# Patient Record
Sex: FEMALE | Race: WHITE | NOT HISPANIC OR LATINO | Employment: PART TIME | URBAN - METROPOLITAN AREA
[De-identification: names, ages, dates, MRNs, and addresses within clinical notes are randomized per-mention and may not be internally consistent; named-entity substitution may affect disease eponyms.]

---

## 2017-01-10 ENCOUNTER — GENERIC CONVERSION - ENCOUNTER (OUTPATIENT)
Dept: OTHER | Facility: OTHER | Age: 32
End: 2017-01-10

## 2017-01-21 ENCOUNTER — GENERIC CONVERSION - ENCOUNTER (OUTPATIENT)
Dept: OTHER | Facility: OTHER | Age: 32
End: 2017-01-21

## 2017-02-23 ENCOUNTER — GENERIC CONVERSION - ENCOUNTER (OUTPATIENT)
Dept: OTHER | Facility: OTHER | Age: 32
End: 2017-02-23

## 2017-03-15 ENCOUNTER — GENERIC CONVERSION - ENCOUNTER (OUTPATIENT)
Dept: OTHER | Facility: OTHER | Age: 32
End: 2017-03-15

## 2017-06-19 ENCOUNTER — GENERIC CONVERSION - ENCOUNTER (OUTPATIENT)
Dept: OTHER | Facility: OTHER | Age: 32
End: 2017-06-19

## 2017-07-19 ENCOUNTER — ALLSCRIPTS OFFICE VISIT (OUTPATIENT)
Dept: OTHER | Facility: OTHER | Age: 32
End: 2017-07-19

## 2017-07-26 ENCOUNTER — GENERIC CONVERSION - ENCOUNTER (OUTPATIENT)
Dept: OTHER | Facility: OTHER | Age: 32
End: 2017-07-26

## 2017-08-17 RX ORDER — MULTIVITAMIN
1 TABLET ORAL DAILY
COMMUNITY

## 2017-08-21 ENCOUNTER — HOSPITAL ENCOUNTER (EMERGENCY)
Facility: HOSPITAL | Age: 32
Discharge: HOME | End: 2017-08-22
Attending: EMERGENCY MEDICINE | Admitting: EMERGENCY MEDICINE
Payer: COMMERCIAL

## 2017-08-21 ENCOUNTER — APPOINTMENT (EMERGENCY)
Dept: RADIOLOGY | Facility: HOSPITAL | Age: 32
End: 2017-08-21
Payer: COMMERCIAL

## 2017-08-21 ENCOUNTER — ANESTHESIA (OUTPATIENT)
Dept: PERIOP | Facility: HOSPITAL | Age: 32
End: 2017-08-21
Payer: COMMERCIAL

## 2017-08-21 ENCOUNTER — ANESTHESIA EVENT (OUTPATIENT)
Dept: PERIOP | Facility: HOSPITAL | Age: 32
End: 2017-08-21
Payer: COMMERCIAL

## 2017-08-21 ENCOUNTER — HOSPITAL ENCOUNTER (OUTPATIENT)
Facility: HOSPITAL | Age: 32
Setting detail: OUTPATIENT SURGERY
Discharge: HOME | End: 2017-08-21
Attending: OBSTETRICS & GYNECOLOGY | Admitting: OBSTETRICS & GYNECOLOGY
Payer: COMMERCIAL

## 2017-08-21 VITALS
HEART RATE: 74 BPM | WEIGHT: 130 LBS | HEIGHT: 64 IN | BODY MASS INDEX: 22.2 KG/M2 | DIASTOLIC BLOOD PRESSURE: 56 MMHG | TEMPERATURE: 97.9 F | SYSTOLIC BLOOD PRESSURE: 95 MMHG | OXYGEN SATURATION: 99 % | RESPIRATION RATE: 16 BRPM

## 2017-08-21 DIAGNOSIS — R10.9 ABDOMINAL PAIN: Primary | ICD-10-CM

## 2017-08-21 DIAGNOSIS — Z30.2 ENCOUNTER FOR STERILIZATION: ICD-10-CM

## 2017-08-21 PROBLEM — Z90.79 STATUS POST BILATERAL SALPINGECTOMY: Status: ACTIVE | Noted: 2017-08-21

## 2017-08-21 LAB
ALBUMIN SERPL BCP-MCNC: 3.8 G/DL (ref 3.5–5)
ALP SERPL-CCNC: 62 U/L (ref 46–116)
ALT SERPL W P-5'-P-CCNC: 15 U/L (ref 12–78)
ANION GAP SERPL CALCULATED.3IONS-SCNC: 9 MMOL/L (ref 4–13)
ANISOCYTOSIS BLD QL SMEAR: PRESENT
AST SERPL W P-5'-P-CCNC: 7 U/L (ref 5–45)
BASOPHILS # BLD MANUAL: 0 THOUSAND/UL (ref 0–0.1)
BASOPHILS NFR MAR MANUAL: 0 % (ref 0–1)
BILIRUB SERPL-MCNC: 0.29 MG/DL (ref 0.2–1)
BUN SERPL-MCNC: 11 MG/DL (ref 5–25)
CALCIUM SERPL-MCNC: 8.3 MG/DL (ref 8.3–10.1)
CHLORIDE SERPL-SCNC: 107 MMOL/L (ref 100–108)
CO2 SERPL-SCNC: 25 MMOL/L (ref 21–32)
CREAT SERPL-MCNC: 0.66 MG/DL (ref 0.6–1.3)
EOSINOPHIL # BLD MANUAL: 0 THOUSAND/UL (ref 0–0.4)
EOSINOPHIL NFR BLD MANUAL: 0 % (ref 0–6)
ERYTHROCYTE [DISTWIDTH] IN BLOOD BY AUTOMATED COUNT: 12.9 % (ref 11.6–15.1)
ERYTHROCYTE [DISTWIDTH] IN BLOOD BY AUTOMATED COUNT: 12.9 % (ref 11.6–15.1)
EXT PREGNANCY TEST URINE: NEGATIVE
GFR SERPL CREATININE-BSD FRML MDRD: 116 ML/MIN/1.73SQ M
GLUCOSE SERPL-MCNC: 116 MG/DL (ref 65–140)
HCT VFR BLD AUTO: 40.8 % (ref 34.8–46.1)
HCT VFR BLD AUTO: 41.7 % (ref 34.8–46.1)
HGB BLD-MCNC: 14 G/DL (ref 11.5–15.4)
HGB BLD-MCNC: 14.3 G/DL (ref 11.5–15.4)
LACTATE SERPL-SCNC: 1.5 MMOL/L (ref 0.5–2)
LIPASE SERPL-CCNC: 90 U/L (ref 73–393)
LYMPHOCYTES # BLD AUTO: 11 % (ref 14–44)
LYMPHOCYTES # BLD AUTO: 2.03 THOUSAND/UL (ref 0.6–4.47)
MCH RBC QN AUTO: 29.9 PG (ref 26.8–34.3)
MCH RBC QN AUTO: 30.6 PG (ref 26.8–34.3)
MCHC RBC AUTO-ENTMCNC: 33.6 G/DL (ref 31.4–37.4)
MCHC RBC AUTO-ENTMCNC: 35 G/DL (ref 31.4–37.4)
MCV RBC AUTO: 87 FL (ref 82–98)
MCV RBC AUTO: 89 FL (ref 82–98)
MONOCYTES # BLD AUTO: 0.55 THOUSAND/UL (ref 0–1.22)
MONOCYTES NFR BLD: 3 % (ref 4–12)
NEUTROPHILS # BLD MANUAL: 15.87 THOUSAND/UL (ref 1.85–7.62)
NEUTS SEG NFR BLD AUTO: 86 % (ref 43–75)
NRBC BLD AUTO-RTO: 0 /100 WBCS
PLATELET # BLD AUTO: 219 THOUSANDS/UL (ref 149–390)
PLATELET # BLD AUTO: 231 THOUSANDS/UL (ref 149–390)
PLATELET BLD QL SMEAR: ADEQUATE
PMV BLD AUTO: 10.7 FL (ref 8.9–12.7)
PMV BLD AUTO: 10.9 FL (ref 8.9–12.7)
POTASSIUM SERPL-SCNC: 3.7 MMOL/L (ref 3.5–5.3)
PROT SERPL-MCNC: 7.3 G/DL (ref 6.4–8.2)
RBC # BLD AUTO: 4.68 MILLION/UL (ref 3.81–5.12)
RBC # BLD AUTO: 4.68 MILLION/UL (ref 3.81–5.12)
RBC MORPH BLD: PRESENT
SODIUM SERPL-SCNC: 141 MMOL/L (ref 136–145)
WBC # BLD AUTO: 18.45 THOUSAND/UL (ref 4.31–10.16)
WBC # BLD AUTO: 6.67 THOUSAND/UL (ref 4.31–10.16)

## 2017-08-21 PROCEDURE — 85007 BL SMEAR W/DIFF WBC COUNT: CPT | Performed by: EMERGENCY MEDICINE

## 2017-08-21 PROCEDURE — 85027 COMPLETE CBC AUTOMATED: CPT | Performed by: EMERGENCY MEDICINE

## 2017-08-21 PROCEDURE — 85027 COMPLETE CBC AUTOMATED: CPT | Performed by: OBSTETRICS & GYNECOLOGY

## 2017-08-21 PROCEDURE — 83605 ASSAY OF LACTIC ACID: CPT | Performed by: EMERGENCY MEDICINE

## 2017-08-21 PROCEDURE — 96375 TX/PRO/DX INJ NEW DRUG ADDON: CPT

## 2017-08-21 PROCEDURE — 83690 ASSAY OF LIPASE: CPT | Performed by: EMERGENCY MEDICINE

## 2017-08-21 PROCEDURE — 74177 CT ABD & PELVIS W/CONTRAST: CPT

## 2017-08-21 PROCEDURE — 88302 TISSUE EXAM BY PATHOLOGIST: CPT | Performed by: OBSTETRICS & GYNECOLOGY

## 2017-08-21 PROCEDURE — 96374 THER/PROPH/DIAG INJ IV PUSH: CPT

## 2017-08-21 PROCEDURE — 96361 HYDRATE IV INFUSION ADD-ON: CPT

## 2017-08-21 PROCEDURE — 36415 COLL VENOUS BLD VENIPUNCTURE: CPT | Performed by: EMERGENCY MEDICINE

## 2017-08-21 PROCEDURE — 81002 URINALYSIS NONAUTO W/O SCOPE: CPT | Performed by: EMERGENCY MEDICINE

## 2017-08-21 PROCEDURE — 81025 URINE PREGNANCY TEST: CPT | Performed by: OBSTETRICS & GYNECOLOGY

## 2017-08-21 PROCEDURE — 80053 COMPREHEN METABOLIC PANEL: CPT | Performed by: EMERGENCY MEDICINE

## 2017-08-21 RX ORDER — ROCURONIUM BROMIDE 10 MG/ML
INJECTION, SOLUTION INTRAVENOUS AS NEEDED
Status: DISCONTINUED | OUTPATIENT
Start: 2017-08-21 | End: 2017-08-21 | Stop reason: SURG

## 2017-08-21 RX ORDER — ONDANSETRON 2 MG/ML
4 INJECTION INTRAMUSCULAR; INTRAVENOUS ONCE AS NEEDED
Status: DISCONTINUED | OUTPATIENT
Start: 2017-08-21 | End: 2017-08-21 | Stop reason: HOSPADM

## 2017-08-21 RX ORDER — OXYCODONE HYDROCHLORIDE AND ACETAMINOPHEN 5; 325 MG/1; MG/1
1 TABLET ORAL EVERY 4 HOURS PRN
Status: DISCONTINUED | OUTPATIENT
Start: 2017-08-21 | End: 2017-08-21 | Stop reason: HOSPADM

## 2017-08-21 RX ORDER — ONDANSETRON 2 MG/ML
INJECTION INTRAMUSCULAR; INTRAVENOUS AS NEEDED
Status: DISCONTINUED | OUTPATIENT
Start: 2017-08-21 | End: 2017-08-21 | Stop reason: SURG

## 2017-08-21 RX ORDER — ONDANSETRON 2 MG/ML
4 INJECTION INTRAMUSCULAR; INTRAVENOUS ONCE
Status: COMPLETED | OUTPATIENT
Start: 2017-08-21 | End: 2017-08-21

## 2017-08-21 RX ORDER — OXYCODONE HYDROCHLORIDE AND ACETAMINOPHEN 5; 325 MG/1; MG/1
1 TABLET ORAL EVERY 4 HOURS PRN
Qty: 10 TABLET | Refills: 0 | Status: SHIPPED | OUTPATIENT
Start: 2017-08-21 | End: 2017-08-31

## 2017-08-21 RX ORDER — FENTANYL CITRATE/PF 50 MCG/ML
25 SYRINGE (ML) INJECTION
Status: DISCONTINUED | OUTPATIENT
Start: 2017-08-21 | End: 2017-08-21 | Stop reason: HOSPADM

## 2017-08-21 RX ORDER — PROPOFOL 10 MG/ML
INJECTION, EMULSION INTRAVENOUS AS NEEDED
Status: DISCONTINUED | OUTPATIENT
Start: 2017-08-21 | End: 2017-08-21 | Stop reason: SURG

## 2017-08-21 RX ORDER — BUPIVACAINE HYDROCHLORIDE 5 MG/ML
INJECTION, SOLUTION PERINEURAL AS NEEDED
Status: DISCONTINUED | OUTPATIENT
Start: 2017-08-21 | End: 2017-08-21 | Stop reason: HOSPADM

## 2017-08-21 RX ORDER — FENTANYL CITRATE 50 UG/ML
INJECTION, SOLUTION INTRAMUSCULAR; INTRAVENOUS AS NEEDED
Status: DISCONTINUED | OUTPATIENT
Start: 2017-08-21 | End: 2017-08-21 | Stop reason: SURG

## 2017-08-21 RX ORDER — METOCLOPRAMIDE HYDROCHLORIDE 5 MG/ML
10 INJECTION INTRAMUSCULAR; INTRAVENOUS ONCE AS NEEDED
Status: DISCONTINUED | OUTPATIENT
Start: 2017-08-21 | End: 2017-08-21 | Stop reason: HOSPADM

## 2017-08-21 RX ORDER — KETOROLAC TROMETHAMINE 30 MG/ML
INJECTION, SOLUTION INTRAMUSCULAR; INTRAVENOUS AS NEEDED
Status: DISCONTINUED | OUTPATIENT
Start: 2017-08-21 | End: 2017-08-21 | Stop reason: SURG

## 2017-08-21 RX ORDER — MAGNESIUM HYDROXIDE 1200 MG/15ML
LIQUID ORAL AS NEEDED
Status: DISCONTINUED | OUTPATIENT
Start: 2017-08-21 | End: 2017-08-21 | Stop reason: HOSPADM

## 2017-08-21 RX ORDER — SODIUM CHLORIDE, SODIUM LACTATE, POTASSIUM CHLORIDE, CALCIUM CHLORIDE 600; 310; 30; 20 MG/100ML; MG/100ML; MG/100ML; MG/100ML
125 INJECTION, SOLUTION INTRAVENOUS CONTINUOUS
Status: DISCONTINUED | OUTPATIENT
Start: 2017-08-21 | End: 2017-08-21 | Stop reason: HOSPADM

## 2017-08-21 RX ORDER — MORPHINE SULFATE 4 MG/ML
4 INJECTION, SOLUTION INTRAMUSCULAR; INTRAVENOUS ONCE
Status: COMPLETED | OUTPATIENT
Start: 2017-08-21 | End: 2017-08-21

## 2017-08-21 RX ORDER — LIDOCAINE HYDROCHLORIDE 10 MG/ML
INJECTION, SOLUTION INFILTRATION; PERINEURAL AS NEEDED
Status: DISCONTINUED | OUTPATIENT
Start: 2017-08-21 | End: 2017-08-21 | Stop reason: SURG

## 2017-08-21 RX ORDER — ONDANSETRON 2 MG/ML
4 INJECTION INTRAMUSCULAR; INTRAVENOUS EVERY 6 HOURS PRN
Status: DISCONTINUED | OUTPATIENT
Start: 2017-08-21 | End: 2017-08-21 | Stop reason: HOSPADM

## 2017-08-21 RX ORDER — OXYCODONE HYDROCHLORIDE AND ACETAMINOPHEN 5; 325 MG/1; MG/1
1 TABLET ORAL EVERY 4 HOURS PRN
Qty: 10 TABLET | Refills: 0 | Status: SHIPPED | OUTPATIENT
Start: 2017-08-21 | End: 2017-08-21 | Stop reason: HOSPADM

## 2017-08-21 RX ORDER — LORAZEPAM 2 MG/ML
1 INJECTION INTRAMUSCULAR ONCE
Status: COMPLETED | OUTPATIENT
Start: 2017-08-21 | End: 2017-08-21

## 2017-08-21 RX ORDER — MIDAZOLAM HYDROCHLORIDE 1 MG/ML
INJECTION INTRAMUSCULAR; INTRAVENOUS AS NEEDED
Status: DISCONTINUED | OUTPATIENT
Start: 2017-08-21 | End: 2017-08-21 | Stop reason: SURG

## 2017-08-21 RX ADMIN — FENTANYL CITRATE 25 MCG: 50 INJECTION INTRAMUSCULAR; INTRAVENOUS at 09:30

## 2017-08-21 RX ADMIN — LORAZEPAM 1 MG: 2 INJECTION INTRAMUSCULAR; INTRAVENOUS at 23:29

## 2017-08-21 RX ADMIN — ONDANSETRON 4 MG: 2 INJECTION INTRAMUSCULAR; INTRAVENOUS at 10:46

## 2017-08-21 RX ADMIN — ONDANSETRON 4 MG: 2 INJECTION INTRAMUSCULAR; INTRAVENOUS at 08:36

## 2017-08-21 RX ADMIN — ONDANSETRON 4 MG: 2 INJECTION INTRAMUSCULAR; INTRAVENOUS at 22:17

## 2017-08-21 RX ADMIN — LIDOCAINE HYDROCHLORIDE 50 MG: 10 INJECTION, SOLUTION INFILTRATION; PERINEURAL at 08:06

## 2017-08-21 RX ADMIN — ROCURONIUM BROMIDE 30 MG: 10 INJECTION, SOLUTION INTRAVENOUS at 08:07

## 2017-08-21 RX ADMIN — HYDROMORPHONE HYDROCHLORIDE 0.4 MG: 1 INJECTION, SOLUTION INTRAMUSCULAR; INTRAVENOUS; SUBCUTANEOUS at 09:51

## 2017-08-21 RX ADMIN — MORPHINE SULFATE 4 MG: 4 INJECTION, SOLUTION INTRAMUSCULAR; INTRAVENOUS at 22:17

## 2017-08-21 RX ADMIN — MIDAZOLAM HYDROCHLORIDE 2 MG: 1 INJECTION, SOLUTION INTRAMUSCULAR; INTRAVENOUS at 07:57

## 2017-08-21 RX ADMIN — DEXAMETHASONE SODIUM PHOSPHATE 5 MG: 10 INJECTION INTRAMUSCULAR; INTRAVENOUS at 08:36

## 2017-08-21 RX ADMIN — SODIUM CHLORIDE, SODIUM LACTATE, POTASSIUM CHLORIDE, AND CALCIUM CHLORIDE: .6; .31; .03; .02 INJECTION, SOLUTION INTRAVENOUS at 07:56

## 2017-08-21 RX ADMIN — FENTANYL CITRATE 150 MCG: 50 INJECTION, SOLUTION INTRAMUSCULAR; INTRAVENOUS at 08:01

## 2017-08-21 RX ADMIN — SODIUM CHLORIDE 1000 ML: 0.9 INJECTION, SOLUTION INTRAVENOUS at 22:19

## 2017-08-21 RX ADMIN — FENTANYL CITRATE 50 MCG: 50 INJECTION, SOLUTION INTRAMUSCULAR; INTRAVENOUS at 08:06

## 2017-08-21 RX ADMIN — FENTANYL CITRATE 25 MCG: 50 INJECTION INTRAMUSCULAR; INTRAVENOUS at 09:36

## 2017-08-21 RX ADMIN — FENTANYL CITRATE 25 MCG: 50 INJECTION INTRAMUSCULAR; INTRAVENOUS at 09:33

## 2017-08-21 RX ADMIN — PROPOFOL 200 MG: 10 INJECTION, EMULSION INTRAVENOUS at 08:06

## 2017-08-21 RX ADMIN — FENTANYL CITRATE 25 MCG: 50 INJECTION INTRAMUSCULAR; INTRAVENOUS at 09:39

## 2017-08-21 RX ADMIN — IOHEXOL 100 ML: 350 INJECTION, SOLUTION INTRAVENOUS at 23:02

## 2017-08-21 RX ADMIN — KETOROLAC TROMETHAMINE 30 MG: 30 INJECTION, SOLUTION INTRAMUSCULAR at 08:55

## 2017-08-22 VITALS
WEIGHT: 130 LBS | BODY MASS INDEX: 22.31 KG/M2 | DIASTOLIC BLOOD PRESSURE: 54 MMHG | SYSTOLIC BLOOD PRESSURE: 102 MMHG | RESPIRATION RATE: 18 BRPM | OXYGEN SATURATION: 97 % | TEMPERATURE: 99.1 F | HEART RATE: 74 BPM

## 2017-08-22 LAB
BACTERIA UR QL AUTO: ABNORMAL /HPF
BILIRUB UR QL STRIP: NEGATIVE
CLARITY UR: CLEAR
CLARITY, POC: CLEAR
COLOR UR: YELLOW
COLOR, POC: YELLOW
GLUCOSE UR STRIP-MCNC: NEGATIVE MG/DL
HGB UR QL STRIP.AUTO: ABNORMAL
HYALINE CASTS #/AREA URNS LPF: ABNORMAL /LPF
KETONES UR STRIP-MCNC: ABNORMAL MG/DL
LEUKOCYTE ESTERASE UR QL STRIP: NEGATIVE
NITRITE UR QL STRIP: NEGATIVE
NON-SQ EPI CELLS URNS QL MICRO: ABNORMAL /HPF
PH UR STRIP.AUTO: 6 [PH] (ref 4.5–8)
PROT UR STRIP-MCNC: NEGATIVE MG/DL
RBC #/AREA URNS AUTO: ABNORMAL /HPF
SP GR UR STRIP.AUTO: 1.01 (ref 1–1.03)
UROBILINOGEN UR QL STRIP.AUTO: 0.2 E.U./DL
WBC #/AREA URNS AUTO: ABNORMAL /HPF

## 2017-08-22 PROCEDURE — 81001 URINALYSIS AUTO W/SCOPE: CPT

## 2017-08-22 PROCEDURE — 99284 EMERGENCY DEPT VISIT MOD MDM: CPT

## 2017-08-22 RX ORDER — ONDANSETRON 4 MG/1
4 TABLET, FILM COATED ORAL EVERY 6 HOURS
Qty: 12 TABLET | Refills: 0 | Status: SHIPPED | OUTPATIENT
Start: 2017-08-22 | End: 2017-11-14

## 2017-08-22 RX ORDER — ALPRAZOLAM 0.5 MG/1
0.5 TABLET ORAL
Qty: 3 TABLET | Refills: 0 | Status: SHIPPED | OUTPATIENT
Start: 2017-08-22 | End: 2017-11-14

## 2017-08-24 ENCOUNTER — TRANSCRIBE ORDERS (OUTPATIENT)
Dept: ADMINISTRATIVE | Facility: HOSPITAL | Age: 32
End: 2017-08-24

## 2017-08-24 ENCOUNTER — TRANSCRIBE ORDERS (OUTPATIENT)
Dept: LAB | Facility: HOSPITAL | Age: 32
End: 2017-08-24

## 2017-08-24 ENCOUNTER — ALLSCRIPTS OFFICE VISIT (OUTPATIENT)
Dept: OTHER | Facility: OTHER | Age: 32
End: 2017-08-24

## 2017-08-24 ENCOUNTER — HOSPITAL ENCOUNTER (OUTPATIENT)
Dept: RADIOLOGY | Facility: HOSPITAL | Age: 32
Discharge: HOME | End: 2017-08-24
Attending: OBSTETRICS & GYNECOLOGY
Payer: COMMERCIAL

## 2017-08-24 ENCOUNTER — APPOINTMENT (OUTPATIENT)
Dept: LAB | Facility: HOSPITAL | Age: 32
End: 2017-08-24
Attending: OBSTETRICS & GYNECOLOGY
Payer: COMMERCIAL

## 2017-08-24 DIAGNOSIS — G89.18 POST-OPERATIVE PAIN: Primary | ICD-10-CM

## 2017-08-24 DIAGNOSIS — G89.18 POST-OPERATIVE PAIN: ICD-10-CM

## 2017-08-24 LAB
ALBUMIN SERPL BCP-MCNC: 3.2 G/DL (ref 3.5–5)
ALP SERPL-CCNC: 52 U/L (ref 46–116)
ALT SERPL W P-5'-P-CCNC: 12 U/L (ref 12–78)
ANION GAP SERPL CALCULATED.3IONS-SCNC: 5 MMOL/L (ref 4–13)
AST SERPL W P-5'-P-CCNC: 8 U/L (ref 5–45)
BASOPHILS # BLD AUTO: 0.02 THOUSANDS/ΜL (ref 0–0.1)
BASOPHILS NFR BLD AUTO: 0 % (ref 0–1)
BILIRUB SERPL-MCNC: 0.29 MG/DL (ref 0.2–1)
BUN SERPL-MCNC: 9 MG/DL (ref 5–25)
CALCIUM SERPL-MCNC: 9 MG/DL (ref 8.3–10.1)
CHLORIDE SERPL-SCNC: 107 MMOL/L (ref 100–108)
CO2 SERPL-SCNC: 28 MMOL/L (ref 21–32)
CREAT SERPL-MCNC: 0.5 MG/DL (ref 0.6–1.3)
EOSINOPHIL # BLD AUTO: 0.14 THOUSAND/ΜL (ref 0–0.61)
EOSINOPHIL NFR BLD AUTO: 2 % (ref 0–6)
ERYTHROCYTE [DISTWIDTH] IN BLOOD BY AUTOMATED COUNT: 12.9 % (ref 11.6–15.1)
GFR SERPL CREATININE-BSD FRML MDRD: 127 ML/MIN/1.73SQ M
GLUCOSE SERPL-MCNC: 101 MG/DL (ref 65–140)
HCT VFR BLD AUTO: 38.4 % (ref 34.8–46.1)
HGB BLD-MCNC: 13 G/DL (ref 11.5–15.4)
LYMPHOCYTES # BLD AUTO: 1.52 THOUSANDS/ΜL (ref 0.6–4.47)
LYMPHOCYTES NFR BLD AUTO: 23 % (ref 14–44)
MCH RBC QN AUTO: 30.1 PG (ref 26.8–34.3)
MCHC RBC AUTO-ENTMCNC: 33.9 G/DL (ref 31.4–37.4)
MCV RBC AUTO: 89 FL (ref 82–98)
MONOCYTES # BLD AUTO: 0.46 THOUSAND/ΜL (ref 0.17–1.22)
MONOCYTES NFR BLD AUTO: 7 % (ref 4–12)
NEUTROPHILS # BLD AUTO: 4.6 THOUSANDS/ΜL (ref 1.85–7.62)
NEUTS SEG NFR BLD AUTO: 68 % (ref 43–75)
NRBC BLD AUTO-RTO: 0 /100 WBCS
PLATELET # BLD AUTO: 221 THOUSANDS/UL (ref 149–390)
PMV BLD AUTO: 10.4 FL (ref 8.9–12.7)
POTASSIUM SERPL-SCNC: 3.2 MMOL/L (ref 3.5–5.3)
PROT SERPL-MCNC: 7.5 G/DL (ref 6.4–8.2)
RBC # BLD AUTO: 4.32 MILLION/UL (ref 3.81–5.12)
SODIUM SERPL-SCNC: 140 MMOL/L (ref 136–145)
WBC # BLD AUTO: 6.76 THOUSAND/UL (ref 4.31–10.16)

## 2017-08-24 PROCEDURE — 74177 CT ABD & PELVIS W/CONTRAST: CPT

## 2017-08-24 PROCEDURE — 85025 COMPLETE CBC W/AUTO DIFF WBC: CPT

## 2017-08-24 PROCEDURE — 80053 COMPREHEN METABOLIC PANEL: CPT

## 2017-08-24 PROCEDURE — 36415 COLL VENOUS BLD VENIPUNCTURE: CPT

## 2017-08-24 RX ADMIN — IOHEXOL 100 ML: 350 INJECTION, SOLUTION INTRAVENOUS at 16:19

## 2017-08-29 ENCOUNTER — GENERIC CONVERSION - ENCOUNTER (OUTPATIENT)
Dept: OTHER | Facility: OTHER | Age: 32
End: 2017-08-29

## 2017-09-07 ENCOUNTER — GENERIC CONVERSION - ENCOUNTER (OUTPATIENT)
Dept: OTHER | Facility: OTHER | Age: 32
End: 2017-09-07

## 2017-11-14 ENCOUNTER — APPOINTMENT (EMERGENCY)
Dept: RADIOLOGY | Facility: HOSPITAL | Age: 32
End: 2017-11-14
Payer: COMMERCIAL

## 2017-11-14 ENCOUNTER — HOSPITAL ENCOUNTER (EMERGENCY)
Facility: HOSPITAL | Age: 32
Discharge: HOME | End: 2017-11-14
Attending: EMERGENCY MEDICINE | Admitting: EMERGENCY MEDICINE
Payer: COMMERCIAL

## 2017-11-14 VITALS
WEIGHT: 130 LBS | BODY MASS INDEX: 22.2 KG/M2 | RESPIRATION RATE: 18 BRPM | TEMPERATURE: 97.3 F | HEIGHT: 64 IN | DIASTOLIC BLOOD PRESSURE: 68 MMHG | OXYGEN SATURATION: 100 % | SYSTOLIC BLOOD PRESSURE: 115 MMHG | HEART RATE: 68 BPM

## 2017-11-14 DIAGNOSIS — N83.201 CYST OF RIGHT OVARY: Primary | ICD-10-CM

## 2017-11-14 DIAGNOSIS — R10.9 RIGHT FLANK PAIN: ICD-10-CM

## 2017-11-14 LAB
ALBUMIN SERPL BCP-MCNC: 3.9 G/DL (ref 3.5–5)
ALP SERPL-CCNC: 70 U/L (ref 46–116)
ALT SERPL W P-5'-P-CCNC: 16 U/L (ref 12–78)
ANION GAP SERPL CALCULATED.3IONS-SCNC: 6 MMOL/L (ref 4–13)
AST SERPL W P-5'-P-CCNC: 27 U/L (ref 5–45)
BASOPHILS # BLD AUTO: 0.03 THOUSANDS/ΜL (ref 0–0.1)
BASOPHILS NFR BLD AUTO: 0 % (ref 0–1)
BILIRUB SERPL-MCNC: 0.33 MG/DL (ref 0.2–1)
BILIRUB UR QL STRIP: NEGATIVE
BUN SERPL-MCNC: 15 MG/DL (ref 5–25)
CALCIUM SERPL-MCNC: 8.8 MG/DL (ref 8.3–10.1)
CHLORIDE SERPL-SCNC: 108 MMOL/L (ref 100–108)
CLARITY UR: CLEAR
CO2 SERPL-SCNC: 25 MMOL/L (ref 21–32)
COLOR UR: YELLOW
CREAT SERPL-MCNC: 0.78 MG/DL (ref 0.6–1.3)
EOSINOPHIL # BLD AUTO: 0.17 THOUSAND/ΜL (ref 0–0.61)
EOSINOPHIL NFR BLD AUTO: 2 % (ref 0–6)
ERYTHROCYTE [DISTWIDTH] IN BLOOD BY AUTOMATED COUNT: 13.3 % (ref 11.6–15.1)
EXT PREG TEST URINE: NEGATIVE
GFR SERPL CREATININE-BSD FRML MDRD: 99 ML/MIN/1.73SQ M
GLUCOSE SERPL-MCNC: 83 MG/DL (ref 65–140)
GLUCOSE UR STRIP-MCNC: NEGATIVE MG/DL
HCT VFR BLD AUTO: 42.4 % (ref 34.8–46.1)
HGB BLD-MCNC: 14.7 G/DL (ref 11.5–15.4)
HGB UR QL STRIP.AUTO: NEGATIVE
KETONES UR STRIP-MCNC: NEGATIVE MG/DL
LEUKOCYTE ESTERASE UR QL STRIP: NEGATIVE
LIPASE SERPL-CCNC: 182 U/L (ref 73–393)
LYMPHOCYTES # BLD AUTO: 2.63 THOUSANDS/ΜL (ref 0.6–4.47)
LYMPHOCYTES NFR BLD AUTO: 25 % (ref 14–44)
MCH RBC QN AUTO: 30.4 PG (ref 26.8–34.3)
MCHC RBC AUTO-ENTMCNC: 34.7 G/DL (ref 31.4–37.4)
MCV RBC AUTO: 88 FL (ref 82–98)
MONOCYTES # BLD AUTO: 0.43 THOUSAND/ΜL (ref 0.17–1.22)
MONOCYTES NFR BLD AUTO: 4 % (ref 4–12)
NEUTROPHILS # BLD AUTO: 7.09 THOUSANDS/ΜL (ref 1.85–7.62)
NEUTS SEG NFR BLD AUTO: 69 % (ref 43–75)
NITRITE UR QL STRIP: NEGATIVE
NRBC BLD AUTO-RTO: 0 /100 WBCS
PH UR STRIP.AUTO: 6 [PH] (ref 4.5–8)
PLATELET # BLD AUTO: 278 THOUSANDS/UL (ref 149–390)
PMV BLD AUTO: 10.7 FL (ref 8.9–12.7)
POTASSIUM SERPL-SCNC: 4.6 MMOL/L (ref 3.5–5.3)
PROT SERPL-MCNC: 7.5 G/DL (ref 6.4–8.2)
PROT UR STRIP-MCNC: NEGATIVE MG/DL
RBC # BLD AUTO: 4.84 MILLION/UL (ref 3.81–5.12)
SODIUM SERPL-SCNC: 139 MMOL/L (ref 136–145)
SP GR UR STRIP.AUTO: 1.01 (ref 1–1.03)
UROBILINOGEN UR QL STRIP.AUTO: 0.2 E.U./DL
WBC # BLD AUTO: 10.39 THOUSAND/UL (ref 4.31–10.16)

## 2017-11-14 PROCEDURE — 99284 EMERGENCY DEPT VISIT MOD MDM: CPT

## 2017-11-14 PROCEDURE — 76830 TRANSVAGINAL US NON-OB: CPT

## 2017-11-14 PROCEDURE — 74176 CT ABD & PELVIS W/O CONTRAST: CPT

## 2017-11-14 PROCEDURE — 81003 URINALYSIS AUTO W/O SCOPE: CPT

## 2017-11-14 PROCEDURE — 96374 THER/PROPH/DIAG INJ IV PUSH: CPT

## 2017-11-14 PROCEDURE — 80053 COMPREHEN METABOLIC PANEL: CPT | Performed by: PHYSICIAN ASSISTANT

## 2017-11-14 PROCEDURE — 76856 US EXAM PELVIC COMPLETE: CPT

## 2017-11-14 PROCEDURE — 81025 URINE PREGNANCY TEST: CPT | Performed by: PHYSICIAN ASSISTANT

## 2017-11-14 PROCEDURE — 83690 ASSAY OF LIPASE: CPT | Performed by: PHYSICIAN ASSISTANT

## 2017-11-14 PROCEDURE — 85025 COMPLETE CBC W/AUTO DIFF WBC: CPT | Performed by: PHYSICIAN ASSISTANT

## 2017-11-14 PROCEDURE — 36415 COLL VENOUS BLD VENIPUNCTURE: CPT | Performed by: PHYSICIAN ASSISTANT

## 2017-11-14 RX ORDER — KETOROLAC TROMETHAMINE 30 MG/ML
15 INJECTION, SOLUTION INTRAMUSCULAR; INTRAVENOUS ONCE
Status: COMPLETED | OUTPATIENT
Start: 2017-11-14 | End: 2017-11-14

## 2017-11-14 RX ADMIN — KETOROLAC TROMETHAMINE 15 MG: 30 INJECTION, SOLUTION INTRAMUSCULAR at 11:14

## 2017-11-14 NOTE — ED ATTENDING ATTESTATION
Mikayla Baca MD, saw and evaluated the patient  I have discussed the patient with the resident/non-physician practitioner and agree with the resident's/non-physician practitioner's findings, Plan of Care, and MDM as documented in the resident's/non-physician practitioner's note, except where noted  All available labs and Radiology studies were reviewed  At this point I agree with the current assessment done in the Emergency Department  I have conducted an independent evaluation of this patient a history and physical is as follows:    Patient presents to the emergency department with a history of intermittent crampy pelvic pain ever since she had her fallopian tube surgery in August   The patient states that these pains were intermittent and mild and never required her to seek medical attention  The patient reports this morning she had a similar pain that was sharp and stabbing and more painful in prior episodes in the right lower quadrant in the right back  The patient therefore came to the emergency department  At this time the patient is feeling much better and has only minimal discomfort in the right lower back with movement  The patient has no discomfort whatsoever when she is at rest   Patient denies fevers, melena, or hematochezia  Physical exam demonstrates a pleasant alert nontoxic female no acute distress  HEENT exam is normal   Lungs are clear with equal breath sounds  The heart had a regular rate rhythm  Abdomen is soft and completely nontender  The back was nontender  Skin had no rash      Critical Care Time  CritCare Time

## 2017-11-14 NOTE — DISCHARGE INSTRUCTIONS
Ovarian Cyst   WHAT YOU NEED TO KNOW:   An ovarian cyst is a sac that grows on an ovary  This sac usually contains fluid, but may sometimes have blood or tissue in it  Most ovarian cysts are harmless and go away without treatment in a few months  Some cysts can grow large, cause pain, or break open  DISCHARGE INSTRUCTIONS:   Call 911 for any of the following:   · You are too weak or dizzy to stand up  Return to the emergency department if:   · You have severe abdominal pain  The pain may be sharp and sudden  · You have a fever  Contact your healthcare provider if:   · Your periods are early, late, or more painful than usual     · You have bleeding from your vagina that is not your period  · You have abdominal pain all the time  · Your abdomen is swollen  · You have feelings of fullness, pressure, or discomfort in your abdomen  · You have trouble urinating or emptying your bladder completely  · You have pain during sex  · You are losing weight without trying  · You have questions or concerns about your condition or care  Medicines: You may need any of the following:  · NSAIDs , such as ibuprofen, help decrease swelling, pain, and fever  This medicine is available with or without a doctor's order  NSAIDs can cause stomach bleeding or kidney problems in certain people  If you take blood thinner medicine, always ask if NSAIDs are safe for you  Always read the medicine label and follow directions  Do not give these medicines to children under 10months of age without direction from your child's healthcare provider  · Birth control pills  may help to control your periods, prevent cysts, or cause them to shrink  · Take your medicine as directed  Contact your healthcare provider if you think your medicine is not helping or if you have side effects  Tell him or her if you are allergic to any medicine  Keep a list of the medicines, vitamins, and herbs you take   Include the amounts, and when and why you take them  Bring the list or the pill bottles to follow-up visits  Carry your medicine list with you in case of an emergency  Follow up with your healthcare provider as directed:  Write down your questions so you remember to ask them during your visits  Apply heat to decrease pain and cramping:  Sit in a warm bath, or place a heating pad (turned on low) or a hot water bottle on your abdomen  Do this for 15 to 20 minutes every hour for as many days as directed  © 2017 [facility] Information is for End User's use only and may not be sold, redistributed or otherwise used for commercial purposes  All illustrations and images included in CareNotes® are the copyrighted property of A D A M [employer] Inc  or [employer]  The above information is an  only  It is not intended as medical advice for individual conditions or treatments  Talk to your doctor, nurse or pharmacist before following any medical regimen to see if it is safe and effective for you

## 2017-11-14 NOTE — ED PROVIDER NOTES
History  Chief Complaint   Patient presents with    Back Pain     Patient states that she was getting ready for work and felt pain in her right side out of no where and it went way a little when she laid down and when she gets back up the pain starts again  49-year-old female who presents to complaining of back pain x1 day  She describes sudden onset of pain in her right flank that was sharp and stabbing and nonradiating  She rated the pain an 8/10 at maximal onset but states her pain currently is 3/10  At the time, she felt nauseous and states she was "doubled over in pain " Her symptoms resolved with lying on her left side but are exacerbated with movement  She denies any radiation of symptoms to the abdomen groin or legs  No attempted alleviating factors  No dysuria, hematuria, urinary urgency or frequency, pelvic pain  No bowel or bladder incontinence, total leg numbness or weakness, saddle anesthesias  She has no history of kidney stones  She states she had bilateral salpingectomy done in August by Dr Uvaldo Pacheco and she has had intermittent pelvic cramping since  Had right-sided pelvic pain over the weekend, symptoms usually come and go in waves and resolve spontaneously  She has no pelvic cramping at this time  No fevers or chills, no vaginal complaints  She has no scheduled follow-up appoint with Dr Uvaldo Pacheco at this time  Prior to Admission Medications   Prescriptions Last Dose Informant Patient Reported? Taking?    Multiple Vitamin (MULTIVITAMIN) tablet 11/13/2017 at Unknown time  Yes Yes   Sig: Take 1 tablet by mouth daily      Facility-Administered Medications: None       Past Medical History:   Diagnosis Date    Anxiety     Depression     GERD (gastroesophageal reflux disease)     History of ITP     as teen    Migraine     Varicella        Past Surgical History:   Procedure Laterality Date    GA LAP,RMV  ADNEXAL STRUCTURE Bilateral 8/21/2017    Procedure: LAPAROSCOPIC SALPINGECTOMY;  Surgeon: Royer Cabral MD;  Location: [facility];  Service: Gynecology    TONSILLECTOMY      WISDOM TOOTH EXTRACTION         History reviewed  No pertinent family history  I have reviewed and agree with the history as documented  Social History   Substance Use Topics    Smoking status: Never Smoker    Smokeless tobacco: Never Used    Alcohol use Yes      Comment: wine rare        Review of Systems   Constitutional: Negative for chills and fever  HENT: Negative for congestion and sore throat  Respiratory: Negative for cough, shortness of breath and wheezing  Cardiovascular: Negative for chest pain and palpitations  Gastrointestinal: Positive for nausea  Negative for abdominal pain, diarrhea and vomiting  Genitourinary: Negative for dysuria, frequency, hematuria, pelvic pain, urgency, vaginal bleeding, vaginal discharge and vaginal pain  Musculoskeletal: Positive for back pain  Skin: Negative for rash  Neurological: Negative for dizziness, weakness, light-headedness, numbness and headaches  Physical Exam  ED Triage Vitals [11/14/17 1011]   Temperature Pulse Respirations Blood Pressure SpO2   (!) 97 3 °F (36 3 °C) 74 18 116/72 99 %      Temp Source Heart Rate Source Patient Position - Orthostatic VS BP Location FiO2 (%)   Oral Monitor Lying Left arm --      Pain Score       6           Orthostatic Vital Signs  Vitals:    11/14/17 1011 11/14/17 1242   BP: 116/72 115/68   Pulse: 74 68   Patient Position - Orthostatic VS: Lying Sitting       Physical Exam   Constitutional: She is oriented to person, place, and time  She appears well-developed and well-nourished  No distress  HENT:   Head: Normocephalic and atraumatic  Right Ear: External ear normal    Left Ear: External ear normal    Nose: Nose normal    Mouth/Throat: Oropharynx is clear and moist    Eyes: Conjunctivae and EOM are normal  Pupils are equal, round, and reactive to light     Neck: Normal range of motion  Neck supple  Cardiovascular: Normal rate, regular rhythm and normal heart sounds  Exam reveals no gallop and no friction rub  No murmur heard  Pulmonary/Chest: Effort normal and breath sounds normal  No respiratory distress  She has no wheezes  She has no rales  Abdominal: Soft  Normal appearance  There is no tenderness  There is no rigidity, no rebound, no guarding and no CVA tenderness  Abdomen soft, nontender to palpation  No rebound guarding or rigidity  No distension  Prior surgical scars c/w laparoscopic salpingectomy  Musculoskeletal: Normal range of motion  Thoracic back: Normal         Lumbar back: Normal         Back:    No midline bony tenderness  Normal range of motion of lower extremities, strength 5/5  Normal dorsiflexion and plantar flexion  Distal sensation and circulation intact  Patellar reflexes 2+ bilaterally  Negative straight leg raise bilaterally  Normal gait  Neurological: She is alert and oriented to person, place, and time  Skin: Skin is warm and dry  Capillary refill takes less than 2 seconds  She is not diaphoretic  Psychiatric: She has a normal mood and affect  Nursing note and vitals reviewed        ED Medications  Medications   ketorolac (TORADOL) 30 mg/mL injection 15 mg (15 mg Intravenous Given 11/14/17 1114)       Diagnostic Studies  Results Reviewed     Procedure Component Value Units Date/Time    Comprehensive metabolic panel [[id number]] Collected:  11/14/17 1113    Lab Status:  Final result Specimen:  Blood from Arm, Left Updated:  11/14/17 1221     Sodium 139 mmol/L      Potassium 4 6 mmol/L      Chloride 108 mmol/L      CO2 25 mmol/L      Anion Gap 6 mmol/L      BUN 15 mg/dL      Creatinine 0 78 mg/dL      Glucose 83 mg/dL      Calcium 8 8 mg/dL      AST 27 U/L      ALT 16 U/L      Alkaline Phosphatase 70 U/L      Total Protein 7 5 g/dL      Albumin 3 9 g/dL      Total Bilirubin 0 33 mg/dL      eGFR 99 ml/min/1 73sq m Narrative:         National Kidney Disease Education Program recommendations are as follows:  GFR calculation is accurate only with a steady state creatinine  Chronic Kidney disease less than 60 ml/min/1 73 sq  meters  Kidney failure less than 15 ml/min/1 73 sq  meters      Lipase [[id number]]  (Normal) Collected:  11/14/17 1113    Lab Status:  Final result Specimen:  Blood from Arm, Left Updated:  11/14/17 1221     Lipase 182 u/L     CBC and differential [[id number]]  (Abnormal) Collected:  11/14/17 1113    Lab Status:  Final result Specimen:  Blood from Arm, Left Updated:  11/14/17 1123     WBC 10 39 (H) Thousand/uL      RBC 4 84 Million/uL      Hemoglobin 14 7 g/dL      Hematocrit 42 4 %      MCV 88 fL      MCH 30 4 pg      MCHC 34 7 g/dL      RDW 13 3 %      MPV 10 7 fL      Platelets 918 Thousands/uL      nRBC 0 /100 WBCs      Neutrophils Relative 69 %      Lymphocytes Relative 25 %      Monocytes Relative 4 %      Eosinophils Relative 2 %      Basophils Relative 0 %      Neutrophils Absolute 7 09 Thousands/µL      Lymphocytes Absolute 2 63 Thousands/µL      Monocytes Absolute 0 43 Thousand/µL      Eosinophils Absolute 0 17 Thousand/µL      Basophils Absolute 0 03 Thousands/µL     POCT pregnancy, urine [[id number]]  (Normal) Resulted:  11/14/17 1023    Lab Status:  Final result Updated:  11/14/17 1121     EXT PREG TEST UR (Ref: Negative) negative    ED Urine Macroscopic [[id number]]  (Normal) Collected:  11/14/17 1131    Lab Status:  Final result Specimen:  Urine Updated:  11/14/17 1024     Color, UA Yellow     Clarity, UA Clear     pH, UA 6 0     Leukocytes, UA Negative     Nitrite, UA Negative     Protein, UA Negative mg/dl      Glucose, UA Negative mg/dl      Ketones, UA Negative mg/dl      Urobilinogen, UA 0 2 E U /dl      Bilirubin, UA Negative     Blood, UA Negative     Specific Gravity, UA 1 010    Narrative:       CLINITEK RESULT                 US pelvis complete w transvaginal   Final Result by Graciela Lane DO (11/14 1506)   Retroflexed uterus  Normal thickness endometrium  Unremarkable left ovary  Right ovarian hemorrhagic cyst or corpus luteum  No evidence of torsion  Workstation performed: [id number]         CT renal stone study abdomen pelvis without contrast   Final Result by Giselle Jack,  (11/14 4143)   1  No obstructing renal calculi  2   Normal CT appearance of the appendix  3   Enlarged right ovary with probable 2 cm hemorrhagic cyst, less likely endometrioma  Fluid in the right adnexa, extending into the pelvis  If there is concern for ovarian torsion, recommend follow-up pelvic ultrasound with ovarian Dopplers for    further evaluation  4   Moderate constipation  5   Cholelithiasis without CT evidence of acute cholecystitis  ##phoslh##phoslh            ##cfslh   I personally discussed this result with Wilbert Duron on 11/14/2017 11:56 AM    ##         Workstation performed: [id number]                    Procedures  Procedures       Phone Contacts  ED Phone Contact    ED Course  ED Course as of Nov 15 2106   e Nov 14, 2017   1222 Patient informed of findings of CT Scan, no pelvic pain at this time, abdomen is soft and non-TTP, will obtain pelvic US to further assess cyst    1223 Pain improved now 1/10    1438 Patient returned from [address]                                MDM  Number of Diagnoses or Management Options  Cyst of right ovary: new and requires workup  Right flank pain: new and requires workup  Diagnosis management comments:   28 yo female who presents with sudden onset of right flank pain with nausea  No urinary symptoms  Will rule out kidney stone  UA without blood  CT renal study negative for stone, findings significant for hemorrhagic ovarian cyst  Pelvic US obtained, confirming right ovarian cyst  Pain improved during ED course with toradol  Recommended NSAIDs for pain, f/u with Dr Fauzia Grossman, RTED precautions given   Patient verbalizes understanding and agrees with plan  Amount and/or Complexity of Data Reviewed  Clinical lab tests: ordered and reviewed  Tests in the radiology section of CPT®: ordered and reviewed  Discuss the patient with other providers: yes (Dr Fili Hancock)  Independent visualization of images, tracings, or specimens: yes    Patient Progress  Patient progress: stable    CritCare Time    Disposition  Final diagnoses:   Cyst of right ovary   Right flank pain     Time reflects when diagnosis was documented in both MDM as applicable and the Disposition within this note     Time User Action Codes Description Comment    11/14/2017  3:30 PM Amish Massey Add [N83 201] Cyst of right ovary     11/14/2017  3:30 PM Ej Kilgore Add [R10 9] Right flank pain       ED Disposition     ED Disposition Condition Comment    Discharge  Wiliam Siemens discharge to home/self care  Condition at discharge: Good        Follow-up Information     Follow up With Specialties Details Why Contact Info Additional Information    Chika Christine MD Obstetrics and Gynecology, Pediatrics   [address] [place] [address] Emergency Department Emergency Medicine  If symptoms worsen - SEVERE PAIN [address]  [phone]  ED, [address] [place] [address], [id number]        Discharge Medication List as of 11/14/2017  3:30 PM      CONTINUE these medications which have NOT CHANGED    Details   Multiple Vitamin (MULTIVITAMIN) tablet Take 1 tablet by mouth daily, Historical Med           No discharge procedures on file      ED Provider  Electronically Signed by           Giselle Egan PA-C  11/15/17 4199

## 2017-11-29 ENCOUNTER — GENERIC CONVERSION - ENCOUNTER (OUTPATIENT)
Dept: OTHER | Facility: OTHER | Age: 32
End: 2017-11-29

## 2018-01-09 NOTE — MISCELLANEOUS
Message   Recorded as Task  Date: 04/26/2016 10:07 AM, Created By: Ellie Stanford  Task Name: Call Back  Assigned To: [address] [facility],Team  Regarding Patient: Marbella Lopez, Status: In Progress  Comment:   Caro oMn - 26 Apr 2016 10:07 AM    TASK CREATED  Caller: Self; [phone] (Home); [phone] (Work)  PT called - she said she has a migraine and she is asking what she will be able to take  please advise [phone]  Josselin Bailon - 26 Apr 2016 10:20 AM    TASK IN PROGRESS  Josselin Bailon - 26 Apr 2016 10:25 AM    TASK REPLIED TO: Previously Assigned To [facility],Team  p/c from PT stating since last evening severe migraine  PT stated blurred vision, flashes  This am no better and is now dizzy, nauseous  PT instructed to go to ER for Eval  PT's mother can drive   Active Problems    1  Encounter for supervision of normal first pregnancy in first trimester (V22 0) (Z34 01)   2  Encounter to determine fetal viability of pregnancy, not applicable or unspecified fetus   (V23 87) (O36 80X0)   3  Exposure to cat feces, initial encounter (V87 39) (T75 89XA)   4  H/O idiopathic thrombocytopenic purpura (V12 3) (Z86 2)   5  Migraines (346 90) (G43 909)   6  Need for prophylactic vaccination with combined diphtheria-tetanus-pertussis (DTaP)   vaccine (V06 1) (Z23)   7  Pregnancy, supervision of first (V22 0) (Z34 00)    Current Meds   1  Complete Prenatal/DHA MISC; Therapy: (Recorded:21Apr2016) to Recorded    Allergies    1  No Known Drug Allergies    2  Dairy   3  Gluten   4   No Known Environmental Allergies    Signatures   Electronically signed by : Aurelio Fung, ; Apr 26 2016 10:25AM EST                       (Author)

## 2018-01-09 NOTE — PROGRESS NOTES
Preliminary Nursing Report                Patient Information    Initial Encounter Entry Date:   2017 10:08 AM EST (Automated Transmission Automated Transmission)       Last Modified:   {Arnav Peter}              Legal Name: Pacheco Martin        Social  Number:        YOB: 1985        Age (years): 27        Gender: F        Body Mass Index (BMI): 22 kg/m2        Height: 64 in  Weight: 128 lbs (58 kgs)           Address:   [place] [address]              Phone: [phone]   (consent to leave messages)        Email:        Ethnicity: Decline to State        Restoration:        Marital Status:        Preferred Language: English        Race: Other Race                    Patient Insurance Information        Primary Insurance Information Carrier Name: {Primary  CarrierName}           Carrier Address:   {Primary  CarrierAddress}              Carrier Phone: {Primary  CarrierPhone}          Group Number: {Primary  GroupNumber}          Policy Number: {Primary  PolicyNumber}          Insured Name: {Primary  InsuredName}          Insured : {Primary  InsuredDOB}          Relationship to Insured: {Primary  RelationshiptoInsured}           Secondary Insurance Information Carrier Name: {Secondary  CarrierName}           Carrier Address:   {Secondary  CarrierAddress}              Carrier Phone: {Secondary  CarrierPhone}          Group Number: {Secondary  GroupNumber}          Policy Number: {Secondary  PolicyNumber}          Insured Name: {Secondary  InsuredName}          Insured : {Secondary  InsuredDOB}          Relationship to Insured: {Secondary  RelationshiptoInsured}                       Health Profile   Booking #:   Viola Whitt #: [id number]               DOS: 2017    Surgery : Laparoscopy, surgical; with removal of adnexal structures (partial or total oophorectomy and/or salpingectomy)    Add'l Procedures/Notes:     Surgery Risk: Intermediate Precautions          Allergies    Dairy       Gluten       No Known Drug Allergies       No Known Environmental Allergies             Medications    Complete Prenatal/DHA MISC       Nystatin 985043 UNIT/GM External Cream       Sertraline HCl - 25 MG Oral Tablet               Conditions    Arthritis       Bowel trouble       Candidal vulvitis       Depression       Encounter for gynecological examination without abnormal finding       Heart murmur       Migraines       Stomach problems       Urinary retention               Family History    None             Surgical History    None             Social History    Almost always uses seat belt       Never a smoker       No alcohol use       Single                               Patient Instructions       Medical Procedure Risk  NPO Instructions   The day before surgery it is recommended to have a light dinner at your usual time and you are allowed a light snack early in the evening  Do not eat anything heavy or eat a big meal after 7pm  Do not eat or drink anything after midnight prior to your surgery  If you are supposed to take any of your medications, do so with a sip of water  Failure to follow these instructions can lead to an increased risk of lung complications and may result in a delay or cancellation of your procedure  If you have any questions, contact your institution for further instructions  No candy, no gum, no mints, no chewing tobacco          Sertraline HCl - 25 MG Oral Tablet  Medication Instruction (SSRI - Antidepressants) 80  Please continue to take this medication on your normal schedule  If this is an oral medication and you take in the morning, you may do so with a sip of water  Testing Considerations       ? Complete Blood Count (CBC) t, client, client  If test was completed and normal within last six months, repeat test is not necessary  Triggered by: Age or Facility Rec         ?  Pregnancy Test t  Consider a urine pregnancy test on the morning of surgery  Triggered by: Age or Facility Rec, Gender         ? Type and Screen client  Type and Screen - Blood: If there is anticipated or possible large blood loss with this procedure, then a Type and Screen for Blood should be ordered  Triggered by: Age or Facility Rec         ? Urinalysis t  Urinalysis may be appropriate if recent urinary symptoms or implants are being placed in surgical procedure  Triggered by: Urinary retention               Consultations       ? Primary Care Physician Evaluation   Primary care physician may need to evaluate patient prior to surgery  This is likely NOT necessary if the listed conditions are chronic and stable  Triggered by: Stomach problems               Miscellaneous Questions         Question: Are you able to walk up a flight of stairs, walk up a hill or do heavy housework WITHOUT having chest pain or shortness of breath? Answer: YES                   Allergies/Conditions/Medications Not Found        The following were not recognized by our system when generating the recommendations  Please consider if this would impact any preoperative protocols  ? Almost always uses seat belt       ? Never a smoker       ? No alcohol use       ? Single       ? [place] [facility] [place] Information         Date:    08/21/2017        Location:    [facility]        Address:           Directions:                      Footnotes revision 14      ?? Denotes a free-text entry  Legal Disclaimer: Any and all recommendations and services provided herein are designed to assist in the preoperative care of the patient  Nothing contained herein is designed to replace, eliminate or alleviate the responsibility of the attending physician to supervise and determine the patient?s preoperative care and course of treatment   Failure to provide complete, accurate information may negatively impact the system?s ability to recommend the proper preoperative protocol  THE ATTENDING PHYSICIAN IS RESPONSIBLE TO REVIEW THE SUGGESTED PREOPERATIVE PROTOCOLS/COURSE OF TREATMENT AND PRESCRIBE THE FINAL COURSE OF PREOPERATIVE TREATMENT IN CONSULTATION WITH THE PATIENT  THE ePREOP SYSTEM AND ITS MATERIALS ARE PROVIDED ? AS IS? WITHOUT WARRANTY OF ANY KIND, EXPRESS OR IMPLIED, INCLUDING, BUT NOT LIMITED TO, WARRANTIES OF PERFORMANCE OR MERCHANTABILITY OR FITNESS FOR A PARTICULAR PURPOSE  PATIENT AND PHYSICIANS HEREBY AGREE THAT THEIR USE OF THE MATERIALS AND RESOURCES ACT AS A CONSENT TO RELEASE AND WAIVE ePREOP FROM ANY AND ALL CLAIMS OF WARRANTY, TORT OR CONTRACT LAW OF ANY KIND  Electronically signed Cristin CONCEPCION    Aug  7 2017 10:02AM EST

## 2018-01-11 NOTE — MISCELLANEOUS
Message   Recorded as Task   Date: 06/19/2017 04:19 PM, Created By: Monty Betancourt   Task Name: Call Back   Assigned To: Shubham Amaya   Regarding Patient: Aide Frost, Status: In Progress   Comment:    Tg Dalton - 19 Jun 2017 4:19 PM     TASK CREATED  Caller: Self; Other; [phone] (Home); [phone] (Work)  pt had unprotected sex 6/9/17 & does not want the option of having a baby, she stated she did NOT take a HPT,  pt just had a baby 11/10/16,  pt would like a call to find out her options  [facility]  - 19 Jun 2017 4:41 PM     [address] - 19 Jun 2017 4:48 PM     TASK EDITED  spoke with RUBIA and at this point there is nothing the pt can do but wait for either a period or a positive pregnancy test  pt will call us if she gets a period and she would like to start the pill  she use to take the generic of Mircette and did great with that     she was due to have a tubal back in January with RK but had to cancel it because her baby had emergency surgery  pt will be in touch        Active Problems    1  Abnormal glucose tolerance in pregnancy (648 80) (O99 810)   2  Arthritis (716 90) (M19 90)   3  Back pain in pregnancy (646 80,724 5) (O26 899,M54 9)   4  Bowel trouble (569 9) (K63 9)   5  Depression (311) (F32 9)   6  Encounter for supervision of other normal pregnancy in third trimester (V22 1) (Z34 83)   7  Encounter to determine fetal viability of pregnancy, not applicable or unspecified fetus   (V23 87) (O36 80X0)   8  Exposure to cat feces, initial encounter (V87 39) (T75 89XA)   9  General counseling for initiation of other contraceptive measures (V25 02) (Z30 09)   10  H/O idiopathic thrombocytopenic purpura (V12 3) (Z86 2)   11  Heart murmur (785 2) (R01 1)   12  Migraines (346 90) (G43 909)   13  Need for prophylactic vaccination with combined diphtheria-tetanus-pertussis (DTaP)    vaccine (V06 1) (Z23)   14   Pregnancy, supervision of first (V22 0) (Z34 00)   15  Pre-op testing (V72 84) (Z01 818)   16  Stomach problems (536 9) (K31 9)   17  Urinary retention (788 20) (R33 9)    Current Meds   1  Complete Prenatal/DHA MISC; Therapy: (Recorded:06Pwe2709) to Recorded   2  Iron Supplement TABS; TAKE 1 TABLET Daily per pt; Therapy: (Recorded:27Lbc6039) to Recorded    Allergies    1  No Known Drug Allergies    2  Dairy   3  Gluten   4   No Known Environmental Allergies    Signatures   Electronically signed by : Abram Tian, ; Jun 19 2017  4:49PM EST                       (Author)

## 2018-01-11 NOTE — MISCELLANEOUS
Message    To Whom it May Concern,    Sherrill Valdivia is a current patient under our professional care  Please excuse Bakari Nash from work until her Infant's health is stable  Signatures    Sincerely,    [facility]          Electronically signed by : Rose Marie Hampton, ; Mar 15 2017  2:40PM EST                       (Author)

## 2018-01-12 VITALS
BODY MASS INDEX: 21.85 KG/M2 | DIASTOLIC BLOOD PRESSURE: 70 MMHG | HEIGHT: 64 IN | SYSTOLIC BLOOD PRESSURE: 114 MMHG | WEIGHT: 128 LBS

## 2018-01-12 NOTE — PROGRESS NOTES
2016         RE: Sesar Lancaster                                To: [facility]  MR#: [id number]                                    [address]   :  [facility]  [id number]   ENC: [id number]                             Marques, Rosaura Guillory Dr   (Exam #: [id number])                           Fax: [phone]      The LMP of this 33year old,  G1, P0-0-0-0 patient was 2016, giving   her an AMADOR of 2016 and a current gestational age of 25 weeks 1 day   by dates  A sonographic examination was performed on 2016 using real   time equipment  The ultrasound examination was performed using abdominal &   vaginal techniques  The patient has a BMI of 23 0  Her blood pressure   today was 110/67        Earliest ultrasound found in her record: 2016   9w4d 2016 AMADOR      Cardiac motion was observed at 158 bpm       INDICATIONS      thrombocytopenia   fetal anatomical survey      Exam Types      LEVEL II   Transvaginal      RESULTS      Fetus # 1 of 1   Transverse presentation   Fetal growth appeared normal   Placenta Location = Anterior   Placenta Grade = I      MEASUREMENTS (* Included In Average GA)      AC              13 8 cm        18 weeks 6 days* (31%)   BPD              4 4 cm        19 weeks 1 day * (32%)   HC              16 6 cm        19 weeks 1 day * (25%)   Femur            3 1 cm        19 weeks 6 days* (35%)      Nuchal Fold      2 9 mm      Humerus          3 0 cm        20 weeks 0 days  (53%)   Radius           2 5 cm        20 weeks 1 day   Ulna             2 6 cm        19 weeks 3 days   Tibia            2 6 cm        19 weeks 2 days  (17%)   Fibula           2 6 cm        18 weeks 4 days   Foot             3 4 cm        20 weeks 4 days      Cerebellum       2 0 cm        19 weeks 5 days   Biorbit          2 9 cm        19 weeks 0 days   CisternaMagna    4 3 mm      HC/AC           1 20   FL/AC           0 22 FL/BPD          0 71   EFW (Ac/Fl/Hc)   288 grams - 0 lbs 10 oz      THE AVERAGE GESTATIONAL AGE is 19 weeks 2 days +/- 10 days  AMNIOTIC FLUID         Largest Vertical Pocket = 4 6 cm   Amniotic Fluid: Normal      CERVICAL EVALUATION      SUPINE      Cervical Length: 3 89 cm      OTHER TEST RESULTS           Funneling?: No             Dynamic Changes?: No        Resp  To TFP?: No      ANATOMY      Head                                    Normal   Face/Neck                               Normal   Th  Cav  Normal   Heart                                   Normal   Abd  Cav  Normal   Stomach                                 Normal   Right Kidney                            Normal   Left Kidney                             Normal   Bladder                                 Normal   Abd  Wall                               Normal   Spine                                   Normal   Extrems                                 Normal   Genitalia                               Normal   Placenta                                Normal   Umbl  Cord                              Normal   Uterus                                  Normal   PCI                                     Normal      ANATOMY DETAILS      Visualized Appearing Sonographically Normal:   HEAD: (Calvarium, BPD Level, Cavum, Lateral Ventricles, Choroid Plexus,   Cerebellum, Cisterna Magna);    FACE/NECK: (Neck, Nuchal Fold, Profile,   Orbits, Nose/Lips, Palate, Face);    TH  CAV : (Diaphragm); HEART:   (Four Chamber View, Proximal Left Outflow, Proximal Right Outflow, 3   Vessel Trachea, Short Axis of Greater Vessels, Ductal Arch, Aortic Arch,   Interventricular Septum, Interatrial Septum, Cardiac Axis, Cardiac   Position);    ABD  CAV , STOMACH, RIGHT KIDNEY, LEFT KIDNEY, BLADDER, ABD     WALL, SPINE: (Cervical Spine, Thoracic Spine, Lumbar Spine, Sacrum);      EXTREMS: (Lt Humerus, Rt Humerus, Lt Forearm, Rt Forearm, Lt Hand, Rt   Hand, Lt Femur, Rt Femur, Lt Low Leg, Rt Low Leg, Lt Foot, Rt Foot);      GENITALIA, PLACENTA, UMBL  CORD, UTERUS, PCI      ADNEXA      The left ovary was not visualized  The right ovary appeared normal and   measured 1 9 x 2 3 x 1 3 cm with a volume of 3 0 cc  IMPRESSION      Gómez IUP   19 weeks and 2 days by this ultrasound  (AMADOR=2016)   Transverse presentation   Fetal growth appeared normal   Normal anatomy survey   Regular fetal heart rate of 158 bpm   Anterior placenta      GENERAL COMMENT         I had the pleasure of seeing Sesar Lancaster in the  center on    for a level II ultrasound  She is a 78-year-old  1 para 0   with a working due date of  currently at 20 weeks and 1 day  She has no significant medical or surgical issues  She denies any   pregnancy complications  Today's ultrasound was reassuring  A viable fetus was seen in the   transverse  presentation  The placenta is anterior in location and there   is no evidence of a placenta previa  There is a marginal placental cord   insertion seen today  Amniotic fluid appeared normal  Fetal growth   appeared very appropriate and correlated well with her due date  There   were no obvious anomalies seen today  The anatomic survey was completed   today  There were no Down syndrome markers seen  Only the right ovary was   seen today and it appeared normal  The left ovary was not well seen  There   were no obvious subchorionic hematomas or uterine myomas  Her cervix was   seen transvaginally and appeared normal in length with no evidence of   funneling or dynamic change  The patient appeared well-nourished, well-developed, and in no apparent   distress  Her uterus is nontender  Her abdomen was gravid and nontender  The anatomic survey was completed today and there were no obvious anatomic   abnormalities  We discussed kick counts in the office today   We discussed the 10 kicks in   2 hour rule  I asked her to call your office if criteria are not met  She   should continue to do her kick counts on a daily basis  Kick counts should   begin at 28 weeks  MISSED ANATOMY: None      NEW FINDINGS ON TODAY'S ULTRASOUND: Marginal placental cord insertion seen   at the very edge of the placenta         IN SUMMARY:  Today's ultrasound is very reassuring  The fetus appears to   be growing well and today's ultrasound correlates very well with her due   date  There were no obvious anomalies nor any Down syndrome markers  The   anatomic survey was completed today  Her cervix was long and closed today   which is also reassuring  Given the normalcy of today's exam, no further ultrasounds are scheduled   for your patient in the future  Certainly we be happy to see her in the   future if it clinical situation arises but I will leave that to your   discretion  Thank you kindly for this referral                Total face-to-face time with the patient, excluding ultrasound time was 15   minutes with more than 50% of the time devoted to counseling and   coordination of care  Thank you very much for allowing me to participate in the care of your   patient  If you have any questions or concerns about today's visit, please   do not hesitate to call me  Sincerely,         COSMO Pham  Maternal Fetal Medicine      TAM Enriquez M D  Maternal-Fetal Medicine   Electronically signed 07/01/16 12:24           Electronically signed by:Valerio CONCEPCION    Jul 6 2016  2:30PM EST

## 2018-01-12 NOTE — MISCELLANEOUS
Message    To Whom it May Concern,    Delia Lamb is a current patient under our professional care  Please excuse Delisa Langston from work until her Infant's health is stable  Active Problems    1  Abnormal glucose tolerance in pregnancy (648 80) (O99 810)   2  Arthritis (716 90) (M19 90)   3  Back pain in pregnancy (646 80,724 5) (O26 899,M54 9)   4  Bowel trouble (569 9) (K63 9)   5  Depression (311) (F32 9)   6  Encounter for supervision of other normal pregnancy in third trimester (V22 1) (Z34 83)   7  Encounter to determine fetal viability of pregnancy, not applicable or unspecified fetus   (V23 87) (O36 80X0)   8  Exposure to cat feces, initial encounter (V87 39) (T75 89XA)   9  General counseling for initiation of other contraceptive measures (V25 02) (Z30 09)   10  H/O idiopathic thrombocytopenic purpura (V12 3) (Z86 2)   11  Heart murmur (785 2) (R01 1)   12  Migraines (346 90) (G43 909)   13  Need for prophylactic vaccination with combined diphtheria-tetanus-pertussis (DTaP)    vaccine (V06 1) (Z23)   14  Pregnancy, supervision of first (V22 0) (Z34 00)   15  Pre-op testing (V72 84) (Z01 818)   16  Stomach problems (536 9) (K31 9)   17  Urinary retention (788 20) (R33 9)    Current Meds   1  Complete Prenatal/DHA MISC; Therapy: (Recorded:61Hjw6800) to Recorded   2  Iron Supplement TABS; TAKE 1 TABLET Daily per pt; Therapy: (Recorded:83Agp2324) to Recorded    Allergies    1  No Known Drug Allergies    2  Dairy   3  Gluten   4  No Known Environmental Allergies    Signatures    Sincerely,    [facility]          Electronically signed by : Kelvin Winn, ; Mar 15 2017  2:38PM EST                       (Author)

## 2018-01-13 VITALS
DIASTOLIC BLOOD PRESSURE: 62 MMHG | SYSTOLIC BLOOD PRESSURE: 102 MMHG | WEIGHT: 125 LBS | HEIGHT: 64 IN | TEMPERATURE: 96.1 F | BODY MASS INDEX: 21.34 KG/M2

## 2018-01-13 NOTE — PROGRESS NOTES
Preliminary Nursing Report                Patient Information    Initial Encounter Entry Date:   2016 10:45 AM EST (Automated Transmission Automated Transmission)       Last Modified:   {Arnav Peter}              Legal Name: Cris Austin        Social  Number:        YOB: 1985        Age (years): 33        Gender: F        Body Mass Index (BMI): 26 kg/m2        Height: 64 in  Weight: 149 lbs (68 kgs)           Address:   [place] [address]              Phone: [phone]   (consent to leave messages)        Email:        Ethnicity: Decline to State        Rastafari:        Marital Status:        Preferred Language: English        Race: Other Race                    Patient Insurance Information        Primary Insurance Information Carrier Name: {Primary  CarrierName}           Carrier Address:   {Primary  CarrierAddress}              Carrier Phone: {Primary  CarrierPhone}          Group Number: {Primary  GroupNumber}          Policy Number: {Primary  PolicyNumber}          Insured Name: {Primary  InsuredName}          Insured : {Primary  InsuredDOB}          Relationship to Insured: {Primary  RelationshiptoInsured}           Secondary Insurance Information Carrier Name: {Secondary  CarrierName}           Carrier Address:   {Secondary  CarrierAddress}              Carrier Phone: {Secondary  CarrierPhone}          Group Number: {Secondary  GroupNumber}          Policy Number: {Secondary  PolicyNumber}          Insured Name: {Secondary  InsuredName}          Insured : {Secondary  InsuredDOB}          Relationship to Insured: {Secondary  RelationshiptoInsured}                       Health Profile   Booking #:   Arthur Grey #: [id number]               DOS: 2017    Surgery : Laparoscopy, surgical; with removal of adnexal structures (partial or total oophorectomy and/or salpingectomy)    Add'l Procedures/Notes:     Surgery Risk: Intermediate Precautions          Allergies    Dairy       Gluten       No Known Drug Allergies       No Known Environmental Allergies             Medications    Complete Prenatal/DHA MISC       Iron Supplement TABS               Conditions    Abnormal glucose tolerance in pregnancy       Arthritis       Back pain in pregnancy       Bowel trouble       Depression       Encounter for supervision of other normal pregnancy in third trimester       Encounter to determine fetal viability of pregnancy, not applicable or unspecified fetus       Exposure to cat feces, initial encounter       General counseling for initiation of other contraceptive measures       H/O idiopathic thrombocytopenic purpura       Heart murmur       Migraines       Need for prophylactic vaccination with combined diphtheria-tetanus-pertussis (DTaP) vaccine       Pregnancy, supervision of first       Stomach problems       Urinary retention               Family History    None             Surgical History    None             Social History    Almost always uses seat belt       Never a smoker       No alcohol use       Single                               Patient Instructions       ? NPO Instructions   The day before surgery it is recommended to have a light dinner at your usual time and you are allowed a light snack early in the evening  Do not eat anything heavy or eat a big meal after 7pm  Do not eat or drink anything after midnight prior to your surgery  If you are supposed to take any of your medications, do so with a sip of water  Failure to follow these instructions can lead to an increased risk of lung complications and may result in a delay or cancellation of your procedure  If you have any questions, contact your institution for further instructions  No candy, no gum, no mints, no chewing tobacco   Triggered by: Medical Procedure Risk         ? Diabetic Medication   Please decrease your morning insulin dose to one-half of normal dose   If you are taking oral diabetes medications, the morning dose should be omitted  If taking metformin (Glucophage), discontinue the medication for 24 hours prior to surgery  If you have an insulin pump, continue at a basal rate only  Triggered by: Abnormal glucose tolerance in pregnancy               Testing Considerations       ? Basic Metabolic Panel (BMP) t  If test was completed and normal within last six months, repeat test is not necessary  Triggered by: Abnormal glucose tolerance in pregnancy         ? Complete Blood Count (CBC) t, client, client  If test was completed and normal within last six months, repeat test is not necessary  Triggered by: Age or Facility Rec         ? Type and Screen client  Type and Screen - Blood: If there is anticipated or possible large blood loss with this procedure, then a Type and Screen for Blood should be ordered  Triggered by: Age or Facility Rec         ? Urinalysis t  Urinalysis may be appropriate if recent urinary symptoms or implants are being placed in surgical procedure  Triggered by: Urinary retention               Consultations       ? Pregnancy   Patient should be evaluated by OB/GYN in order to determine readiness and optimal timing of procedure  Special precautions may also be required, including fetal heart monitoring  Triggered by: Back pain in pregnancy         ? Primary Care Physician Evaluation   Primary care physician may need to evaluate patient prior to surgery  This is likely NOT necessary if the listed conditions are chronic and stable  Triggered by: Abnormal glucose tolerance in pregnancy, Stomach problems               Miscellaneous Questions         Question: Are you able to walk up a flight of stairs, walk up a hill or do heavy housework WITHOUT having chest pain or shortness of breath? Answer: YES                   Allergies/Conditions/Medications Not Found        The following were not recognized by our system when generating the recommendations   Please consider if this would impact any preoperative protocols  ? Almost always uses seat belt       ? Encounter to determine fetal viability of pregnancy, not applicable or unspecified fetus       ? H/O idiopathic thrombocytopenic purpura       ? Never a smoker       ? No alcohol use       ? Single       ? Complete Prenatal/DHA MISC       ? Iron Supplement TABS                  Appointment Information         Date:    01/09/2017        Location:    [facility]        Address:           Directions:                      Footnotes revision 14      ?? Denotes a free-text entry  Legal Disclaimer: Any and all recommendations and services provided herein are designed to assist in the preoperative care of the patient  Nothing contained herein is designed to replace, eliminate or alleviate the responsibility of the attending physician to supervise and determine the patient?s preoperative care and course of treatment  Failure to provide complete, accurate information may negatively impact the system?s ability to recommend the proper preoperative protocol  THE ATTENDING PHYSICIAN IS RESPONSIBLE TO REVIEW THE SUGGESTED PREOPERATIVE PROTOCOLS/COURSE OF TREATMENT AND PRESCRIBE THE FINAL COURSE OF PREOPERATIVE TREATMENT IN CONSULTATION WITH THE PATIENT  THE ePREOP SYSTEM AND ITS MATERIALS ARE PROVIDED ? AS IS? WITHOUT WARRANTY OF ANY KIND, EXPRESS OR IMPLIED, INCLUDING, BUT NOT LIMITED TO, WARRANTIES OF PERFORMANCE OR MERCHANTABILITY OR FITNESS FOR A PARTICULAR PURPOSE  PATIENT AND PHYSICIANS HEREBY AGREE THAT THEIR USE OF THE MATERIALS AND RESOURCES ACT AS A CONSENT TO RELEASE AND WAIVE ePREOP FROM ANY AND ALL CLAIMS OF WARRANTY, TORT OR CONTRACT LAW OF ANY KIND             Electronically signed by:Bennett Chávez DO  Dec 14 2016  4:23PM EST

## 2018-01-13 NOTE — MISCELLANEOUS
Message   Recorded as Task   Date: 08/25/2017 11:03 AM, Created By: Vicki Celestin   Task Name: Call Back   Assigned To: Stephanie Dunbar   Regarding Patient: Robinson Serrano, Status: Active   Comment:    Tg Dalton - 25 Aug 2017 11:03 AM     TASK CREATED  Caller: Self; Other; [phone] (Home); [phone] (Work)  pt was in to see you 8/24 for a post op prob to a lap tubal on 8/21,  she is NOT feeling any better at all, pt was thinking about going back to work 8/28,  pls call pt to advise [phone]   [facility] - 28 Aug 2017 9:44 AM     TASK EDITED  pt is calling back today- she did not go to work  has had abdominal cramping w/diahrrea since her surgery last Mon  no bleeding or discharge  is going to pcp today  pls call  Argelia Leslie - 29 Aug 2017 10:20 AM     TASK EDITED  Pt calling to speak with you regarding her going back to work  Pt would like to be out until Friday but needs a note and had questions     spoke with patient, still has pain, took percocet  had diarrhea till today, no fever but sweats and chills  to be out of work till 09/02/2017      Signatures   Electronically signed by : COSMO Esteban ; Aug 29 2017  2:24PM EST                       (Author)

## 2018-01-13 NOTE — MISCELLANEOUS
Message   Recorded as Task   Date: 02/23/2017 12:58 PM, Created By: Vicki Celestin   Task Name: Miscellaneous   Assigned To: Anali Gaston   Regarding Patient: Robinson Serrano, Status: In Progress   Comment:    Tg Dalton - 23 Feb 2017 12:58 PM     TASK CREATED  Caller: Self; Other; [phone] (Home); [phone] (Work)  pt deliv 11/10/16, pt needs back to work note, pls fax to [phone] attn: Breanna Espinoza - 23 Feb 2017 1:32 PM     TASK EDITED  s/w patient, she said Dr Michelle Adams told her at pre-op appt she did not need to come back for a PP visit, she was OK  The surgery was cancelled because her daughter is still in hospital with heart failure, had surgery  coming home soon, She went back to work on 2/13  Would like note faxed to work  I told her I would verify this with Dr Michelle Adams and then fax note  Evette Luis - 23 Feb 2017 1:32 PM     TASK IN PROGRESS   Daliadavid Bañuelos - 23 Feb 2017 2:30 PM     TASK EDITED  I completed a note, Dr Michelle Adams had given her a note before the surgery was cancelled to return after the surgery  He cleared her, so I wrote her a note to return Post partum  Faxed to her employer  She has been back to work since 2/13  Active Problems    1  Abnormal glucose tolerance in pregnancy (648 80) (O99 810)   2  Arthritis (716 90) (M19 90)   3  Back pain in pregnancy (646 80,724 5) (O26 899,M54 9)   4  Bowel trouble (569 9) (K63 9)   5  Depression (311) (F32 9)   6  Encounter for supervision of other normal pregnancy in third trimester (V22 1) (Z34 83)   7  Encounter to determine fetal viability of pregnancy, not applicable or unspecified fetus   (V23 87) (O36 80X0)   8  Exposure to cat feces, initial encounter (V87 39) (T75 89XA)   9  General counseling for initiation of other contraceptive measures (V25 02) (Z30 09)   10  H/O idiopathic thrombocytopenic purpura (V12 3) (Z86 2)   11  Heart murmur (785 2) (R01 1)   12  Migraines (346 90) (G43 909)   13   Need for prophylactic vaccination with combined diphtheria-tetanus-pertussis (DTaP)    vaccine (V06 1) (Z23)   14  Pregnancy, supervision of first (V22 0) (Z34 00)   15  Pre-op testing (V72 84) (Z01 818)   16  Stomach problems (536 9) (K31 9)   17  Urinary retention (788 20) (R33 9)    Current Meds   1  Complete Prenatal/DHA MISC; Therapy: (Recorded:64Yha9955) to Recorded   2  Iron Supplement TABS; TAKE 1 TABLET Daily per pt; Therapy: (Recorded:44Raw6222) to Recorded    Allergies    1  No Known Drug Allergies    2  Dairy   3  Gluten   4   No Known Environmental Allergies    Signatures   Electronically signed by : Azul Corcoran, ; Feb 23 2017  2:31PM EST                       (Author)

## 2018-01-14 NOTE — RESULT NOTES
Message   Please ensure that a hardcopy is signed off by the doctor in [place]       Verified Results  (1) SEQUENTIAL SCREEN 2 [id number] 11:12AM Arleth Olp     Test Name Result Flag Reference   SCAN RESULT      Results available in the [facility], [facility]

## 2018-01-15 NOTE — MISCELLANEOUS
Message   Recorded as Task   Date: 03/02/2017 08:55 AM, Created By: Brittani Boucher   Task Name: Miscellaneous   Assigned To: Juliocesar Fagan   Regarding Patient: Ricardo Andersen, Status: In Progress   Comment:    Argelia Leslie - 02 Mar 2017 8:55 AM     TASK CREATED  Caller: Self; Other; [phone] (Work)  Pt called and wanted to speak with you regarding additional note similar to the one you had written for work  pt is @ [phone]   [facility] - 02 Mar 2017 1:39 PM     TASK EDITED  spoke with pt - I will check with Dr Sandip Cox / [facility] on adding additional message to existing note for pt    wants to have added:  had surgery with Philipp Braun for 1/16/17 but had to cnl due to daughter needing open heart surgery  [facility] - 25 Mar 2017 2:05 PM     TASK REASSIGNED: Previously Assigned To Rick Clay - 03 Mar 2017 9:41 AM     TASK EDITED   [facility] - 03 Mar 2017 3:17 PM     TASK IN PROGRESS   [facility] - 08 Mar 2017 10:44 AM     TASK REASSIGNED: Previously Assigned To [facility]  Have been working with this pt for awhile    she has been very confusing in her request for specific out of work notes    Pt was given a note clearing for her work as of her [employer]  She did not go back to work that day because of her pending surgery with you that was later canceled due to infants health issues  Now pt is going to court over child support with FOB  She would like some kind of note covering her for not going back to work after PPAR    explained to pt that she was medically cleared as of that day and I did not believe we could excuse her from work after that date? Would you agree? Thank you! [facility] - 08 Mar 2017 10:44 AM     TASK EDITED   Maya Mejia - 08 Mar 2017 4:04 PM     TASK REPLIED TO: Previously Assigned To Maya Mejia  Patient had significant extenuating circumstances with her daughter's need for surgery    I would be fine with justifying no return to work until baby is stable   [facility] - 09 Mar 2017 8:23 AM     TASK REASSIGNED: Previously Assigned To Fabi Lopez - 09 Mar 2017 8:23 AM     TASK IN PROGRESS   [facility] - 09 Mar 2017 8:23 AM     TASK EDITED  [facility] for pt to c/b   [facility] - 15 Mar 2017 10:53 AM     TASK EDITED  [facility] for pt to c/b   [facility]wynn - 15 Mar 2017 2:45 PM     TASK EDITED  Spoke to pt  Note written and offered  She will pick it up in the [facility] office tomorrow  Active Problems    1  Abnormal glucose tolerance in pregnancy (648 80) (O99 810)   2  Arthritis (716 90) (M19 90)   3  Back pain in pregnancy (646 80,724 5) (O26 899,M54 9)   4  Bowel trouble (569 9) (K63 9)   5  Depression (311) (F32 9)   6  Encounter for supervision of other normal pregnancy in third trimester (V22 1) (Z34 83)   7  Encounter to determine fetal viability of pregnancy, not applicable or unspecified fetus   (V23 87) (O36 80X0)   8  Exposure to cat feces, initial encounter (V87 39) (T75 89XA)   9  General counseling for initiation of other contraceptive measures (V25 02) (Z30 09)   10  H/O idiopathic thrombocytopenic purpura (V12 3) (Z86 2)   11  Heart murmur (785 2) (R01 1)   12  Migraines (346 90) (G43 909)   13  Need for prophylactic vaccination with combined diphtheria-tetanus-pertussis (DTaP)    vaccine (V06 1) (Z23)   14  Pregnancy, supervision of first (V22 0) (Z34 00)   15  Pre-op testing (V72 84) (Z01 818)   16  Stomach problems (536 9) (K31 9)   17  Urinary retention (788 20) (R33 9)    Current Meds   1  Complete Prenatal/DHA MISC; Therapy: (Recorded:16Auw8095) to Recorded   2  Iron Supplement TABS; TAKE 1 TABLET Daily per pt; Therapy: (Recorded:77Etz0303) to Recorded    Allergies    1  No Known Drug Allergies    2  Dairy   3  Gluten   4   No Known Environmental Allergies    Signatures   Electronically signed by : Lalit Naranjo, ; Mar 15 2017  2:45PM EST                       (Author)

## 2018-01-15 NOTE — MISCELLANEOUS
Message  Return to work or school:        Excuse from work due to issues from minor surgery on 8/21/2017 to be out till 09/02/2017          Signatures   Electronically signed by : COSMO Read ; Aug 29 2017  2:21PM EST                       (Author)    Electronically signed by : COSMO Read ; Sep 22 2017  3:08PM EST

## 2018-01-15 NOTE — MISCELLANEOUS
Message  Spoke w/ evening  Had cancelled lap salpingectomy due to  being dx'd with heart failure and having subsequent cardiac surgery to correct congenital issue with coronary arteries  Baby currently on ECMO at [address]  Support provided  Pt seems well emotionally  Will contact office when crisis is over        Signatures   Electronically signed by : Supa Martinez DO; 2017  8:33PM EST                       (Author)

## 2018-01-15 NOTE — PROGRESS NOTES
MAY 13 2016         RE: Eladio Bucio                                To: [facility]  MR#: [id number]                                    [address]   : [address]   ENC: [phone]:[address]   (Exam #: [id number])                           Fax: [phone]      The LMP of this 33year old,  G1, P0-0-0-0 patient was 2016, giving   her an AMADOR of 2016 and a current gestational age of 17 weeks 1 day   by dates  A sonographic examination was performed on MAY 13 2016 using   real time equipment  The ultrasound examination was performed using   abdominal technique  The patient has a BMI of 22 1  Her blood pressure   today was 102/69  Earliest ultrasound found in her record: 2016   9w4d 2016 AMADOR      Thank you very much for referring this very nice patient for a    consultation and genetic screening ultrasound  This is the patient's   first pregnancy  The father the baby is not involved  The patient has a   history of depression and multiple medical conditions including arthritis   and back pain as well as migraines  She has some food sensitivities as   well  She reports that 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 but her last platelet count was normal at 274,000  Her surgical   history is significant for a tonsillectomy and wisdom teeth extraction  She denies the current use of tobacco, alcohol, or drugs  Her medications   include prenatal vitamins and folic acid and she has no known drug   allergies  Her family medical history is unremarkable  The father of the   baby reportedly has 3 other children who are healthy  A review of systems   is otherwise negative  On exam, the patient appears well, in no acute   distress, and her abdomen is nontender        Cardiac motion was observed at 159 bpm       INDICATIONS      first trimester genetic screening   thrombocytopenia      Exam Types      Level I   sequential screen      RESULTS      Fetus # 1 of 1   Variable presentation   Placenta Location = Posterior   Placenta Grade = 0      MEASUREMENTS (* Included In Average GA)      CRL              7 1 cm        13 weeks 1 day *      THE AVERAGE GESTATIONAL AGE is 13 weeks 1 day +/- 7 days  AMNIOTIC FLUID         Largest Vertical Pocket = 5 6 cm   Amniotic Fluid: Normal      UTERINE ARTERIES                                  S/D   PI    RI    NOTCH       Right Uterine Artery       2 83  1 14  0 65      ADNEXA      The left ovary appeared normal and measured 3 0 x 2 6 x 1 7 cm with a   volume of 6 9 cc  The right ovary appeared normal and measured 3 1 x 2 3 x   1 8 cm with a volume of 6 7 cc  IMPRESSION      Gómez IUP   13 weeks and 1 day by this ultrasound  (AMADOR=NOV 17 2016)   Variable presentation   Regular fetal heart rate of 159 bpm   Posterior placenta      GENERAL COMMENT      We discussed the options for genetic screening, including but not limited   to first trimester screening, second trimester screening, combined first   and second trimester screening, noninvasive prenatal testing (NIPT) for   patients at high risk and diagnostic screening through the use of CVS and   amniocentesis  We discussed the risks and benefits of each approach   including the sensitivities and false positive rates as well as the   difference between a screening test and a diagnostic test   At the   conclusion of our discussion the patient elected Serum Integrated   Screening to delineate her risk for fetal aneuploidy  A maternal blood   sample was obtained on the day of sonogram   The second stage of Serum   Integrated screening should be completed between the 15th and 21st week of   pregnancy (ideally between 16-18 weeks)    The results of Serum Integrated   Screening should be available after her second blood draw and will be   reported from [address] [place] [address]  Thank you very much for allowing us to participate in the care of this   very nice patient  Should you have any questions, please do not hesitate   to contact our office  Please note, in addition to the time spent discussing the results of the   ultrasound, I spent approximately 15 minutes of face-to-face time with the   patient, greater than 50% of which was spent in counseling and the   coordination of care for this patient  TAM Izquierdo M D     Electronically signed 05/13/16 13:12

## 2018-01-16 NOTE — MISCELLANEOUS
Message   Recorded as Task   Date: 06/24/2016 09:30 AM, Created By: Roberto Hernandez   Task Name: Call Back   Assigned To: Diogenes WISDOM,Team   Regarding Patient: Kathy Shi, Status: In Progress   Comment:    Caro Mon - 24 Jun 2016 9:30 AM     TASK CREATED  Caller: Self; [phone] (Home); [phone] (Work)  pt called - she has had a migraine for over 24 hours and she doesnt know what to do to get rid of it  please advise [phone]   Josselin Bailon - 24 Jun 2016 9:43 AM     TASK REPLIED TO: Previously Assigned To [facility],[place]  lmom to    Josselin Bailon - 24 Jun 2016 9:55 AM     TASK IN PROGRESS   Leigh Villagomez - 28 Jun 2016 3:07 PM     TASK REPLIED TO: Previously Assigned To [facility],Team  Have not heard back from pt  Has pending apt tomorrow with CG  Active Problems    1  Arthritis (716 90) (M19 90)   2  Bowel trouble (569 9) (K63 9)   3  Depression (311) (F32 9)   4  Encounter for supervision of normal first pregnancy in first trimester (V22 0) (Z34 01)   5  Encounter to determine fetal viability of pregnancy, not applicable or unspecified fetus   (V23 87) (O36 80X0)   6  Exposure to cat feces, initial encounter (V87 39) (T75 89XA)   7  H/O idiopathic thrombocytopenic purpura (V12 3) (Z86 2)   8  Migraines (346 90) (G43 909)   9  Need for prophylactic vaccination with combined diphtheria-tetanus-pertussis (DTaP)   vaccine (V06 1) (Z23)   10  Pregnancy, supervision of first (V22 0) (Z34 00)   11  Stomach problems (536 9) (K31 9)   12  Urinary retention (788 20) (R33 9)    Current Meds   1  Complete Prenatal/DHA MISC; Therapy: (Recorded:21Apr2016) to Recorded    Allergies    1  No Known Drug Allergies    2  Dairy   3  Gluten   4   No Known Environmental Allergies    Signatures   Electronically signed by : Dustin Fletcher, ; Jun 28 2016  3:07PM EST                       (Author)

## 2018-01-16 NOTE — MISCELLANEOUS
Message  Karl Rousseau has been under my care for pregnancy  She had a vaginal delivery on 11/12/2016  Her expected time off from work is 6 weeks  At this time, the patient is released from our care and may return to work unrestricted as of 12/25/2016      Stephen Adamson DO        Signatures   Electronically signed by : Chay Nathan, ; Feb 23 2017  1:51PM EST                       (Author)

## 2018-01-16 NOTE — MISCELLANEOUS
Message   Recorded as Task   Date: 2016 03:12 PM, Created By: Yan Garcia   Task Name: Care Coordination   Assigned To: Josselin Bailon   Regarding Patient: Devon Gibson, Status: In Progress   Comment:    Josselin Bailon - 26 Aug 2016 3:12 PM     TASK CREATED  placed referral for pt  Pt unable to do 1 hr GDS  Thanks   Araseli Holliday - 29 Aug 2016 9:37 AM     TASK IN PROGRESS   Araseli Holliday - 01 Sep 2016 11:13 AM     TASK REPLIED TO: Previously Assigned To  diabetic education,team  Thank you for referral Pt will come in today to be seen   Active Problems    1  Arthritis (716 90) (M19 90)   2  Back pain in pregnancy (646 80,724 5) (O26 899)   3  Bowel trouble (569 9) (K63 9)   4  Depression (311) (F32 9)   5  Encounter for supervision of normal first pregnancy in first trimester (V22 0) (Z34 01)   6  Encounter to determine fetal viability of pregnancy, not applicable or unspecified fetus   (V23 87) (O36 80X0)   7  Exposure to cat feces, initial encounter (V87 39) (T75 89XA)   8  H/O idiopathic thrombocytopenic purpura (V12 3) (Z86 2)   9  Migraines (346 90) (G43 909)   10  Need for prophylactic vaccination with combined diphtheria-tetanus-pertussis (DTaP)    vaccine (V06 1) (Z23)   11  Pregnancy, supervision of first (V22 0) (Z34 00)   12  Stomach problems (536 9) (K31 9)   13  Urinary retention (788 20) (R33 9)    Current Meds   1  Complete Prenatal/DHA MISC; Therapy: (Recorded:30Ajs3611) to Recorded   2  Iron Supplement TABS; TAKE 1 TABLET Daily per pt; Therapy: (Recorded:[id number]) to Recorded    Allergies    1  No Known Drug Allergies    2  Dairy   3  Gluten   4   No Known Environmental Allergies    Signatures   Electronically signed by : Rae Saavedra, ; Sep 12 2016  4:30PM EST                       (Author)

## 2018-01-17 NOTE — MISCELLANEOUS
Message  Patient is under my professional care  At this time her return to work date is 02/13/2017, to be re-evaluated at her post operative visit  Return to work or school: 02/13/2017         Dr Amita Campbell DO        Signatures   Electronically signed by : Amita Campbell DO; Jan 11 2017 10:34PM EST

## 2018-01-18 NOTE — MISCELLANEOUS
Message   DM Non-compliance [facility]:     Date: 2016     AMADOR: 2016     Dear Nam Ledezma:     It has come to our attention that you have not followed through with your recommended diabetes plan of care  As a patient that is currently receiving treatment for diabetes during pregnancy, you have been counseled regarding the following: the importance of regular blood glucose (sugar) monitoring; reporting the blood glucose levels to our office; nutrition and medication adjustments as directed by our office  You are receiving this letter because our records indicate that you are currently non-compliant with your treatment for the following reason(s): Other: Education completedSeptember 9  No follow up blood glucose levels reported  Blood glucose (sugar) levels that are high during pregnancy can result in problems for both mother and unborn child  These include, but are not limited to;     - Fetal macrosomia (large baby)  This can lead to shoulder dystocia (dislocated shoulder in the baby during delivery), need for a , vaginal and rectal tearing for the mother      -  hypoglycemia (low blood sugar in the baby after delivery)  This can lead to admission to the intensive care unit, and the need for intravenous glucose (sugar) given to the baby  - Fetal demise (death) or stillbirth  - [place] respiratory distress ( being unable to breathe on it's own)  Can lead to intensive care unit admission      - Pre term labor  We have been unable to resolve these concerns with you directly  Due to the complexity of this treatment plan, we kindly request that you contact us to resolve these outstanding issues  Please be advised that continued non-compliance may result in consequences, including but not limited to, our inability to continue to prescribe this treatment plan for you, and possible discharge from our care       Upon receipt of this notice, please contact us at [phone] to arrange a necessary follow-up  Thank you for allowing us to continue to care for you  Sincerely,     [facility] Diabetes and Pregnancy Team            Patient Care Team    Care Team Member Role Specialty Office Number   Funmilayo CONCEPCION  Obstetrics/Gynecology [phone]   Volodymyr Montgomery MD  Obstetrics/Gynecology [phone]   Sameera Boone MD  Obstetrics/Gynecology [phone]   Finn Brambila DO  Obstetrics/Gynecology [phone]   Jair CONCEPCION  Obstetrics/Gynecology [phone]   Constantine CONCEPCION  - [phone]   Sujata Davis MD  Obstetrics/Gynecology [phone]   Aravind CONCEPCION , MD, error  - [phone]   Patrick CON[facility]  - [phone]   Jonathon CONCEPCION    Urology [phone]     Signatures   Electronically signed by : Vianca Waldron; 2016  3:38PM EST                       (Author)

## 2018-01-22 VITALS
SYSTOLIC BLOOD PRESSURE: 132 MMHG | BODY MASS INDEX: 21.17 KG/M2 | DIASTOLIC BLOOD PRESSURE: 64 MMHG | WEIGHT: 124 LBS | HEIGHT: 64 IN

## 2018-01-22 VITALS
BODY MASS INDEX: 21.34 KG/M2 | WEIGHT: 125 LBS | DIASTOLIC BLOOD PRESSURE: 64 MMHG | SYSTOLIC BLOOD PRESSURE: 116 MMHG | HEIGHT: 64 IN

## 2018-02-15 ENCOUNTER — TRANSCRIBE ORDERS (OUTPATIENT)
Dept: ADMINISTRATIVE | Facility: HOSPITAL | Age: 33
End: 2018-02-15

## 2018-02-15 DIAGNOSIS — R11.0 NAUSEA: Primary | ICD-10-CM

## 2018-02-15 DIAGNOSIS — R10.32 ABDOMINAL PAIN, LEFT LOWER QUADRANT: ICD-10-CM

## 2018-02-15 DIAGNOSIS — R93.5 ABNORMAL ABDOMINAL ULTRASOUND: ICD-10-CM

## 2018-06-07 ENCOUNTER — OFFICE VISIT (OUTPATIENT)
Dept: OBGYN CLINIC | Facility: CLINIC | Age: 33
End: 2018-06-07
Payer: COMMERCIAL

## 2018-06-07 VITALS — BODY MASS INDEX: 22.18 KG/M2 | SYSTOLIC BLOOD PRESSURE: 122 MMHG | WEIGHT: 129.2 LBS | DIASTOLIC BLOOD PRESSURE: 72 MMHG

## 2018-06-07 DIAGNOSIS — R10.12 ABDOMINAL PAIN, LEFT UPPER QUADRANT: Primary | ICD-10-CM

## 2018-06-07 PROBLEM — M75.102 ROTATOR CUFF SYNDROME, LEFT: Status: ACTIVE | Noted: 2018-04-16

## 2018-06-07 PROBLEM — B37.3 CANDIDAL VULVITIS: Status: ACTIVE | Noted: 2017-07-19

## 2018-06-07 PROBLEM — B37.31 CANDIDAL VULVITIS: Status: ACTIVE | Noted: 2017-07-19

## 2018-06-07 PROBLEM — B37.31 CANDIDAL VULVITIS: Status: RESOLVED | Noted: 2017-07-19 | Resolved: 2018-06-07

## 2018-06-07 PROBLEM — Z90.79 STATUS POST BILATERAL SALPINGECTOMY: Status: RESOLVED | Noted: 2017-08-21 | Resolved: 2018-06-07

## 2018-06-07 PROBLEM — J30.1 SEASONAL ALLERGIC RHINITIS DUE TO POLLEN: Status: ACTIVE | Noted: 2017-05-03

## 2018-06-07 PROBLEM — B37.3 CANDIDAL VULVITIS: Status: RESOLVED | Noted: 2017-07-19 | Resolved: 2018-06-07

## 2018-06-07 PROCEDURE — 99213 OFFICE O/P EST LOW 20 MIN: CPT | Performed by: OBSTETRICS & GYNECOLOGY

## 2018-06-07 RX ORDER — CYCLOBENZAPRINE HCL 10 MG
TABLET ORAL
Refills: 0 | COMMUNITY
Start: 2018-04-16

## 2018-06-07 RX ORDER — RIZATRIPTAN BENZOATE 10 MG/1
10 TABLET, ORALLY DISINTEGRATING ORAL
COMMUNITY
Start: 2018-02-02 | End: 2019-04-30

## 2018-06-07 RX ORDER — ALPRAZOLAM 0.5 MG/1
TABLET ORAL
COMMUNITY
Start: 2017-08-22

## 2018-06-07 NOTE — PROGRESS NOTES
Patient presents today for a problem visit  She  Went to her gastroenterologist due to upper abdominal pain Left side  She had a CT scan done 6/5/2018 and was found to have a cyst on her ovary  Before they proceed with a colonoscopy she was told she needed to have the cyst on her ovary followed up on

## 2018-06-07 NOTE — PROGRESS NOTES
Patient is here to have consultation for left  upper quadrant abdominal pain  This appears was eating and goes with nausea as well  She is be seen by GI specialist  As part of a workup she had a CT scan  This showed a 2 cm cystic structure in the pelvis  Her GI specialist would like to rule out any connection between this structure and her abdominal pain  In August of last year patient underwent a bilateral salpingectomy for sterilization  She had postop ED visits with CT scans as well for diffuse abdominal pain  No cause of pain was then noted  Laparoscopic inspection at that time was normal    Physical exam:  Not a good discharge well developed well-nourished female  Abdomen soft nontender no masses palpated no rebound pain, no guarding  Pelvic exam:  Normal vulva vagina cervix is healthy  No cervix motion  Normal size anteverted flexed mobile uterus  No adnexal masses or tenderness were noted    Problem List Items Addressed This Visit        Other    Abdominal pain, left upper quadrant - Primary     No gyn cause for her left upper quadrant abdominal pain was noted  The small cyst noted on CT scan is considered to be physiologic as part of of ovulatory function of her ovaries  This was a normal pelvic evaluation

## 2018-06-07 NOTE — ASSESSMENT & PLAN NOTE
No gyn cause for her left upper quadrant abdominal pain was noted  The small cyst noted on CT scan is considered to be physiologic as part of of ovulatory function of her ovaries  This was a normal pelvic evaluation

## 2018-06-12 PROCEDURE — 88305 TISSUE EXAM BY PATHOLOGIST: CPT | Performed by: PATHOLOGY

## 2018-06-12 PROCEDURE — 88342 IMHCHEM/IMCYTCHM 1ST ANTB: CPT | Performed by: PATHOLOGY

## 2018-06-13 ENCOUNTER — LAB REQUISITION (OUTPATIENT)
Dept: LAB | Facility: HOSPITAL | Age: 33
End: 2018-06-13
Payer: COMMERCIAL

## 2018-06-13 DIAGNOSIS — R11.10 VOMITING: ICD-10-CM

## 2018-06-13 DIAGNOSIS — R10.30 LOWER ABDOMINAL PAIN: ICD-10-CM

## 2018-06-13 DIAGNOSIS — K63.5 POLYP OF COLON: ICD-10-CM

## 2018-06-13 DIAGNOSIS — R10.32 LEFT LOWER QUADRANT PAIN: ICD-10-CM

## 2018-06-13 DIAGNOSIS — R11.2 NAUSEA WITH VOMITING: ICD-10-CM

## 2018-07-26 ENCOUNTER — ANNUAL EXAM (OUTPATIENT)
Dept: OBGYN CLINIC | Facility: CLINIC | Age: 33
End: 2018-07-26
Payer: COMMERCIAL

## 2018-07-26 VITALS
SYSTOLIC BLOOD PRESSURE: 112 MMHG | HEIGHT: 64 IN | DIASTOLIC BLOOD PRESSURE: 68 MMHG | BODY MASS INDEX: 21.34 KG/M2 | WEIGHT: 125 LBS

## 2018-07-26 DIAGNOSIS — Z01.419 ENCOUNTER FOR GYNECOLOGICAL EXAMINATION (GENERAL) (ROUTINE) WITHOUT ABNORMAL FINDINGS: Primary | ICD-10-CM

## 2018-07-26 PROCEDURE — S0612 ANNUAL GYNECOLOGICAL EXAMINA: HCPCS | Performed by: OBSTETRICS & GYNECOLOGY

## 2018-07-26 PROCEDURE — 87624 HPV HI-RISK TYP POOLED RSLT: CPT | Performed by: OBSTETRICS & GYNECOLOGY

## 2018-07-26 PROCEDURE — G0145 SCR C/V CYTO,THINLAYER,RESCR: HCPCS | Performed by: OBSTETRICS & GYNECOLOGY

## 2018-07-26 NOTE — PROGRESS NOTES
7/26/2018    Assesment:    Problem List Items Addressed This Visit        Other    Encounter for gynecological examination (general) (routine) without abnormal findings - Primary     Gyn exam was normal  Discussed   her pain with ovulation           Relevant Orders    Liquid-based pap, screening             Kahlil Mona is a 26 y o  female who is here for a routine gyn exam,  Can still have pain fullness during ovulation  In past has tiny cyst on right ovary thought to be ovulation related  Can also have some painful as was her menses       Patient Active Problem List   Diagnosis    Chest pain    Arthritis    Bowel trouble    Depression    Depression with anxiety    Fibromyalgia    GERD (gastroesophageal reflux disease)    Heart murmur    Irritable bowel syndrome    Migraines    Panic disorder    Periodic headache syndrome, not intractable    Rotator cuff syndrome, left    Seasonal allergic rhinitis due to pollen    Stomach problems    Vitamin D deficiency    Abdominal pain, left upper quadrant    Encounter for gynecological examination (general) (routine) without abnormal findings       Social History     Social History    Marital status:      Spouse name: N/A    Number of children: N/A    Years of education: N/A     Occupational History    Not on file       Social History Main Topics    Smoking status: Never Smoker    Smokeless tobacco: Never Used    Alcohol use Yes      Comment: wine rare    Drug use: No    Sexual activity: Yes     Partners: Male     Birth control/ protection: Female Sterilization     Other Topics Concern    Not on file     Social History Narrative    No narrative on file     Family History   Problem Relation Age of Onset    Heart defect Daughter         ALCAPA    ALS Maternal Grandmother     Heart block Maternal Grandmother      Past Medical History:   Diagnosis Date    Anxiety     Candidal vulvitis 7/19/2017    Depression     GERD (gastroesophageal reflux disease)     History of ITP     as teen    Migraine     Normal delivery     2016 daughter   [employer] Urinary retention 2016    Varicella        Current Outpatient Prescriptions:     ACIDOPHILUS LACTOBACILLUS PO, Take one tablet by mouth daily for supplement , Disp: , Rfl:     ALPRAZolam (XANAX) 0 5 mg tablet, Take by mouth, Disp: , Rfl:     Multiple Vitamin (MULTIVITAMIN) tablet, Take 1 tablet by mouth daily, Disp: , Rfl:     rizatriptan (MAXALT-MLT) 10 MG disintegrating tablet, Take 10 mg by mouth, Disp: , Rfl:     cyclobenzaprine (FLEXERIL) 10 mg tablet, TAKE 1 TABLET EVERY 8 HOURS AS NEEDED MUSCLE SPASMS, Disp: , Rfl: 0      Bladder control: stress incontinence no                             urgency   no    1 Para 1001      denies complaints with intercourse  Gynecologic History  Patient's last menstrual period was 2018 (exact date)  Her birth control is: tubal ligation  Last Pap: 2017  Results were: normal  Last mammogram:   Results were: normal    The following portions of the patient's history were reviewed and updated as appropriate: allergies, current medications, past family history, past medical history, past social history, past surgical history and problem list     Review of Systems  Review of Systems   Constitutional: Negative for fatigue  Respiratory: Negative for shortness of breath  Cardiovascular: Negative for chest pain and palpitations  Gastrointestinal: Negative for abdominal distention, abdominal pain and constipation  Genitourinary: Negative for dyspareunia, frequency, hematuria and pelvic pain          Objective     /68   Ht 5' 4" (1 626 m)   Wt 56 7 kg (125 lb)   LMP 2018 (Exact Date)   BMI 21 46 kg/m²     General Appearance:    Alert, cooperative, no distress, appears stated age                               Lungs:     Clear to auscultation bilaterally, respirations unlabored        Heart:    Regular rate and rhythm, S1 and S2 normal, no murmur, rub   or gallop   Breast Exam:  normal nipple, no mass, no skin change   Abdomen:     Soft, non-tender, bowel sounds active all four quadrants,     no masses, no organomegaly     Genitalia      :Vulva: normal, no lesions  Vagina: normal mucosa  Cervix: normal appearance and thin prep PAP obtained  Uterus: normal size, anteverted, normal shape and consistency  Adnexa: normal adnexa and no mass, fullness, tenderness     Rectal:   not done   Extremities:   Extremities normal, atraumatic, no cyanosis or edema       Skin:   Skin color, texture, turgor normal, no rashes or lesions   Lymph nodes:   Cervical, supraclavicular, and axillary nodes normal

## 2018-07-26 NOTE — PROGRESS NOTES
Patient presents today for a yearly GYN exam    She complains of pain on her ovaries every month with her cycly      Pap: 6/5/2015 neg/HPV neg

## 2018-07-30 LAB
HPV RRNA GENITAL QL NAA+PROBE: NORMAL
LAB AP GYN PRIMARY INTERPRETATION: NORMAL
Lab: NORMAL

## 2019-04-12 ENCOUNTER — OFFICE VISIT (OUTPATIENT)
Dept: OBGYN CLINIC | Facility: CLINIC | Age: 34
End: 2019-04-12
Payer: COMMERCIAL

## 2019-04-12 VITALS — BODY MASS INDEX: 23.17 KG/M2 | WEIGHT: 135 LBS | DIASTOLIC BLOOD PRESSURE: 70 MMHG | SYSTOLIC BLOOD PRESSURE: 112 MMHG

## 2019-04-12 DIAGNOSIS — N76.1 CHRONIC VAGINITIS: Primary | ICD-10-CM

## 2019-04-12 PROCEDURE — 99213 OFFICE O/P EST LOW 20 MIN: CPT | Performed by: NURSE PRACTITIONER

## 2019-04-12 PROCEDURE — 87660 TRICHOMONAS VAGIN DIR PROBE: CPT | Performed by: NURSE PRACTITIONER

## 2019-04-12 PROCEDURE — 87591 N.GONORRHOEAE DNA AMP PROB: CPT | Performed by: NURSE PRACTITIONER

## 2019-04-12 PROCEDURE — 87510 GARDNER VAG DNA DIR PROBE: CPT | Performed by: NURSE PRACTITIONER

## 2019-04-12 PROCEDURE — 87491 CHLMYD TRACH DNA AMP PROBE: CPT | Performed by: NURSE PRACTITIONER

## 2019-04-12 PROCEDURE — 87480 CANDIDA DNA DIR PROBE: CPT | Performed by: NURSE PRACTITIONER

## 2019-04-12 RX ORDER — FLUCONAZOLE 150 MG/1
TABLET ORAL
Refills: 0 | COMMUNITY
Start: 2019-04-08 | End: 2019-04-23 | Stop reason: ALTCHOICE

## 2019-04-14 LAB
CANDIDA RRNA VAG QL PROBE: NEGATIVE
G VAGINALIS RRNA GENITAL QL PROBE: NEGATIVE
T VAGINALIS RRNA GENITAL QL PROBE: NEGATIVE

## 2019-04-16 LAB
C TRACH DNA SPEC QL NAA+PROBE: NEGATIVE
N GONORRHOEA DNA SPEC QL NAA+PROBE: NEGATIVE

## 2019-04-18 ENCOUNTER — TELEPHONE (OUTPATIENT)
Dept: OBGYN CLINIC | Facility: CLINIC | Age: 34
End: 2019-04-18

## 2019-04-20 DIAGNOSIS — N76.3 CHRONIC VULVITIS: Primary | ICD-10-CM

## 2019-04-20 RX ORDER — CLOBETASOL PROPIONATE 0.5 MG/G
CREAM TOPICAL
Qty: 30 G | Refills: 0 | Status: SHIPPED | OUTPATIENT
Start: 2019-04-20 | End: 2019-04-23 | Stop reason: ALTCHOICE

## 2019-04-23 ENCOUNTER — OFFICE VISIT (OUTPATIENT)
Dept: OBGYN CLINIC | Facility: CLINIC | Age: 34
End: 2019-04-23
Payer: COMMERCIAL

## 2019-04-23 ENCOUNTER — TELEPHONE (OUTPATIENT)
Dept: OBGYN CLINIC | Facility: CLINIC | Age: 34
End: 2019-04-23

## 2019-04-23 VITALS — WEIGHT: 132 LBS | SYSTOLIC BLOOD PRESSURE: 120 MMHG | DIASTOLIC BLOOD PRESSURE: 80 MMHG | BODY MASS INDEX: 22.66 KG/M2

## 2019-04-23 DIAGNOSIS — N94.819 VULVODYNIA: Primary | ICD-10-CM

## 2019-04-23 PROCEDURE — 99214 OFFICE O/P EST MOD 30 MIN: CPT | Performed by: NURSE PRACTITIONER

## 2019-04-23 RX ORDER — ESTRADIOL 0.1 MG/G
CREAM VAGINAL
Qty: 42.5 G | Refills: 2 | Status: SHIPPED | OUTPATIENT
Start: 2019-04-23

## 2019-04-26 ENCOUNTER — TELEPHONE (OUTPATIENT)
Dept: OBGYN CLINIC | Facility: CLINIC | Age: 34
End: 2019-04-26

## 2019-04-30 ENCOUNTER — OFFICE VISIT (OUTPATIENT)
Dept: OBGYN CLINIC | Facility: CLINIC | Age: 34
End: 2019-04-30
Payer: COMMERCIAL

## 2019-04-30 VITALS — SYSTOLIC BLOOD PRESSURE: 94 MMHG | WEIGHT: 131 LBS | DIASTOLIC BLOOD PRESSURE: 62 MMHG | BODY MASS INDEX: 22.49 KG/M2

## 2019-04-30 DIAGNOSIS — N76.1 CHRONIC VAGINITIS: Primary | ICD-10-CM

## 2019-04-30 PROCEDURE — 99213 OFFICE O/P EST LOW 20 MIN: CPT | Performed by: OBSTETRICS & GYNECOLOGY

## 2019-04-30 RX ORDER — FLUCONAZOLE 150 MG/1
TABLET ORAL
COMMUNITY
Start: 2019-04-08

## 2019-04-30 RX ORDER — ONDANSETRON 4 MG/1
TABLET, ORALLY DISINTEGRATING ORAL
Refills: 0 | COMMUNITY
Start: 2019-03-01

## 2019-05-22 ENCOUNTER — TELEPHONE (OUTPATIENT)
Dept: OBGYN CLINIC | Facility: CLINIC | Age: 34
End: 2019-05-22

## 2019-05-23 ENCOUNTER — TELEPHONE (OUTPATIENT)
Dept: OBGYN CLINIC | Facility: CLINIC | Age: 34
End: 2019-05-23

## 2019-05-24 ENCOUNTER — OFFICE VISIT (OUTPATIENT)
Dept: OBGYN CLINIC | Facility: MEDICAL CENTER | Age: 34
End: 2019-05-24
Payer: COMMERCIAL

## 2019-05-24 VITALS — BODY MASS INDEX: 22.62 KG/M2 | WEIGHT: 131.8 LBS | SYSTOLIC BLOOD PRESSURE: 128 MMHG | DIASTOLIC BLOOD PRESSURE: 64 MMHG

## 2019-05-24 DIAGNOSIS — N76.3 CHRONIC VULVITIS: Primary | ICD-10-CM

## 2019-05-24 PROCEDURE — 99213 OFFICE O/P EST LOW 20 MIN: CPT | Performed by: OBSTETRICS & GYNECOLOGY

## 2019-05-24 RX ORDER — HYDROXYZINE HYDROCHLORIDE 25 MG/1
25 TABLET, FILM COATED ORAL EVERY 6 HOURS PRN
Qty: 30 TABLET | Refills: 0 | Status: SHIPPED | OUTPATIENT
Start: 2019-05-24

## 2019-06-07 ENCOUNTER — TELEPHONE (OUTPATIENT)
Dept: OBGYN CLINIC | Facility: CLINIC | Age: 34
End: 2019-06-07

## 2019-06-07 DIAGNOSIS — N76.1 CHRONIC VAGINITIS: Primary | ICD-10-CM

## 2019-06-07 RX ORDER — KETOCONAZOLE 200 MG/1
200 TABLET ORAL DAILY
Qty: 14 TABLET | Refills: 0 | Status: SHIPPED | OUTPATIENT
Start: 2019-06-07 | End: 2019-06-21

## 2019-06-19 ENCOUNTER — TELEPHONE (OUTPATIENT)
Dept: OBGYN CLINIC | Facility: CLINIC | Age: 34
End: 2019-06-19

## 2019-07-11 ENCOUNTER — TELEPHONE (OUTPATIENT)
Dept: OBGYN CLINIC | Facility: CLINIC | Age: 34
End: 2019-07-11

## 2019-07-16 ENCOUNTER — TELEPHONE (OUTPATIENT)
Dept: OBGYN CLINIC | Facility: CLINIC | Age: 34
End: 2019-07-16

## 2020-02-07 ENCOUNTER — HOSPITAL ENCOUNTER (EMERGENCY)
Facility: HOSPITAL | Age: 35
Discharge: HOME | End: 2020-02-07
Attending: EMERGENCY MEDICINE | Admitting: EMERGENCY MEDICINE
Payer: COMMERCIAL

## 2020-02-07 VITALS
SYSTOLIC BLOOD PRESSURE: 112 MMHG | RESPIRATION RATE: 16 BRPM | WEIGHT: 140 LBS | HEART RATE: 76 BPM | HEIGHT: 64 IN | BODY MASS INDEX: 23.9 KG/M2 | OXYGEN SATURATION: 98 % | DIASTOLIC BLOOD PRESSURE: 69 MMHG

## 2020-02-07 DIAGNOSIS — E87.6 HYPOKALEMIA: ICD-10-CM

## 2020-02-07 DIAGNOSIS — R07.89 ATYPICAL CHEST PAIN: Primary | ICD-10-CM

## 2020-02-07 DIAGNOSIS — I49.3 PVC'S (PREMATURE VENTRICULAR CONTRACTIONS): ICD-10-CM

## 2020-02-07 LAB
ALBUMIN SERPL BCP-MCNC: 4 G/DL (ref 3.5–5)
ALP SERPL-CCNC: 58 U/L (ref 46–116)
ALT SERPL W P-5'-P-CCNC: 15 U/L (ref 12–78)
ANION GAP SERPL CALCULATED.3IONS-SCNC: 9 MMOL/L (ref 4–13)
AST SERPL W P-5'-P-CCNC: 12 U/L (ref 5–45)
ATRIAL RATE: 76 BPM
BASOPHILS # BLD AUTO: 0.04 THOUSANDS/ΜL (ref 0–0.1)
BASOPHILS NFR BLD AUTO: 0 % (ref 0–1)
BILIRUB SERPL-MCNC: 0.2 MG/DL (ref 0.2–1)
BUN SERPL-MCNC: 14 MG/DL (ref 5–25)
CALCIUM SERPL-MCNC: 8.9 MG/DL (ref 8.3–10.1)
CHLORIDE SERPL-SCNC: 105 MMOL/L (ref 100–108)
CO2 SERPL-SCNC: 29 MMOL/L (ref 21–32)
CREAT SERPL-MCNC: 0.65 MG/DL (ref 0.6–1.3)
EOSINOPHIL # BLD AUTO: 0.15 THOUSAND/ΜL (ref 0–0.61)
EOSINOPHIL NFR BLD AUTO: 2 % (ref 0–6)
ERYTHROCYTE [DISTWIDTH] IN BLOOD BY AUTOMATED COUNT: 13.3 % (ref 11.6–15.1)
GFR SERPL CREATININE-BSD FRML MDRD: 115 ML/MIN/1.73SQ M
GLUCOSE SERPL-MCNC: 79 MG/DL (ref 65–140)
HCT VFR BLD AUTO: 40.9 % (ref 34.8–46.1)
HGB BLD-MCNC: 13.8 G/DL (ref 11.5–15.4)
IMM GRANULOCYTES # BLD AUTO: 0.03 THOUSAND/UL (ref 0–0.2)
IMM GRANULOCYTES NFR BLD AUTO: 0 % (ref 0–2)
LYMPHOCYTES # BLD AUTO: 3.26 THOUSANDS/ΜL (ref 0.6–4.47)
LYMPHOCYTES NFR BLD AUTO: 35 % (ref 14–44)
MCH RBC QN AUTO: 30 PG (ref 26.8–34.3)
MCHC RBC AUTO-ENTMCNC: 33.7 G/DL (ref 31.4–37.4)
MCV RBC AUTO: 89 FL (ref 82–98)
MONOCYTES # BLD AUTO: 0.46 THOUSAND/ΜL (ref 0.17–1.22)
MONOCYTES NFR BLD AUTO: 5 % (ref 4–12)
NEUTROPHILS # BLD AUTO: 5.4 THOUSANDS/ΜL (ref 1.85–7.62)
NEUTS SEG NFR BLD AUTO: 58 % (ref 43–75)
NRBC BLD AUTO-RTO: 0 /100 WBCS
P AXIS: 52 DEGREES
PLATELET # BLD AUTO: 312 THOUSANDS/UL (ref 149–390)
PMV BLD AUTO: 10.1 FL (ref 8.9–12.7)
POTASSIUM SERPL-SCNC: 3.2 MMOL/L (ref 3.5–5.3)
PR INTERVAL: 144 MS
PROT SERPL-MCNC: 7.3 G/DL (ref 6.4–8.2)
QRS AXIS: 79 DEGREES
QRSD INTERVAL: 94 MS
QT INTERVAL: 418 MS
QTC INTERVAL: 470 MS
RBC # BLD AUTO: 4.6 MILLION/UL (ref 3.81–5.12)
SODIUM SERPL-SCNC: 143 MMOL/L (ref 136–145)
T WAVE AXIS: 73 DEGREES
TROPONIN I SERPL-MCNC: <0.02 NG/ML
VENTRICULAR RATE: 76 BPM
WBC # BLD AUTO: 9.34 THOUSAND/UL (ref 4.31–10.16)

## 2020-02-07 PROCEDURE — 84484 ASSAY OF TROPONIN QUANT: CPT

## 2020-02-07 PROCEDURE — 93010 ELECTROCARDIOGRAM REPORT: CPT | Performed by: INTERNAL MEDICINE

## 2020-02-07 PROCEDURE — 80053 COMPREHEN METABOLIC PANEL: CPT

## 2020-02-07 PROCEDURE — 36415 COLL VENOUS BLD VENIPUNCTURE: CPT

## 2020-02-07 PROCEDURE — 99285 EMERGENCY DEPT VISIT HI MDM: CPT

## 2020-02-07 PROCEDURE — 85025 COMPLETE CBC W/AUTO DIFF WBC: CPT

## 2020-02-07 PROCEDURE — 99284 EMERGENCY DEPT VISIT MOD MDM: CPT | Performed by: EMERGENCY MEDICINE

## 2020-02-07 PROCEDURE — 93005 ELECTROCARDIOGRAM TRACING: CPT

## 2020-02-07 RX ORDER — POTASSIUM CHLORIDE 20 MEQ/1
40 TABLET, EXTENDED RELEASE ORAL ONCE
Status: COMPLETED | OUTPATIENT
Start: 2020-02-07 | End: 2020-02-07

## 2020-02-07 RX ADMIN — POTASSIUM CHLORIDE 40 MEQ: 1500 TABLET, EXTENDED RELEASE ORAL at 14:36

## 2020-02-07 NOTE — ED PROVIDER NOTES
History  Chief Complaint   Patient presents with    Chest Pain     patient complaint of chest pain midepigastric x 20 min     HPI    20-year-old female presents for are epigastric and ches pain  Onset was hour prior to arrival   Feels like pressure  Feels dizzy  Feels like the world is closing in on her  Similar episodes in the past   Was at work when this happened  She denies any stressful events however  Denies radiation of pain shortness of breath pleuritic pain  No other associated symptoms modifying factors  Minimal pain right now  EKG read by me as normal sinus rhythm  No other abnormalities on physical exam     Assessment plan:  Chest pain atypical cardiac workup likely discharge likely anxiety    Prior to Admission Medications   Prescriptions Last Dose Informant Patient Reported? Taking? ACIDOPHILUS LACTOBACILLUS PO  Self Yes No   Sig: Take one tablet by mouth daily for supplement    ALPRAZolam (XANAX) 0 5 mg tablet  Self Yes No   Sig: Take by mouth   Multiple Vitamin (MULTIVITAMIN) tablet  Self Yes No   Sig: Take 1 tablet by mouth daily   cyclobenzaprine (FLEXERIL) 10 mg tablet  Self Yes No   Sig: TAKE 1 TABLET EVERY 8 HOURS AS NEEDED MUSCLE SPASMS   estradiol (ESTRACE) 0 1 mg/g vaginal cream  Self No No   Sig: Apply thin film to affected area 2 times per day for one week, then nightly   Patient not taking: Reported on 4/30/2019   fluconazole (DIFLUCAN) 150 mg tablet  Self Yes No   Sig: One daily x 5 days   hydrOXYzine HCL (ATARAX) 25 mg tablet   No No   Sig: Take 1 tablet (25 mg total) by mouth every 6 (six) hours as needed for itching   nystatin-triamcinolone (MYCOLOG-II) cream   No No   Sig: Apply topically 2 (two) times a day for 14 days   ondansetron (ZOFRAN-ODT) 4 mg disintegrating tablet  Self Yes No   Sig:    TAKE 1-2 TABS BY MOUTH EVERY 8 HOURS AS NEEDED FOR NAUSEA/VOMITING   rizatriptan (MAXALT-MLT) 10 MG disintegrating tablet  Self Yes No   Sig: Take 10 mg by mouth   sertraline (ZOLOFT) 50 mg tablet  Self Yes No   Si/2TAB EACH EVENING FOR A WEEK THEN GOTO 1TAB THEREAFTER FOR MOOD   terconazole (TERAZOL 3) 0 8 % vaginal cream  Self Yes No   Sig: Apply daily as directed for yeast vaginitis      Facility-Administered Medications: None       Past Medical History:   Diagnosis Date    Anxiety     Candidal vulvitis 2017    Depression     GERD (gastroesophageal reflux disease)     History of ITP     as teen    Migraine     Normal delivery      daughter   Ania Castillo Urinary retention 2016    Varicella        Past Surgical History:   Procedure Laterality Date    SD LAP,RMV  ADNEXAL STRUCTURE Bilateral 2017    bilateral salpingectomy    TONSILLECTOMY      TUBAL LIGATION      fallopian removal    WISDOM TOOTH EXTRACTION         Family History   Problem Relation Age of Onset    Heart defect Daughter         ALCAPA    ALS Maternal Grandmother     Heart block Maternal Grandmother      I have reviewed and agree with the history as documented  Social History     Tobacco Use    Smoking status: Never Smoker    Smokeless tobacco: Never Used   Substance Use Topics    Alcohol use: Yes     Comment: wine rare    Drug use: No        Review of Systems   Constitutional: Negative for chills, fatigue and fever  Eyes: Negative for photophobia and visual disturbance  Respiratory: Positive for shortness of breath  Negative for cough  Cardiovascular: Negative for chest pain, palpitations and leg swelling  Gastrointestinal: Negative for diarrhea, nausea and vomiting  Endocrine: Negative for polydipsia and polyuria  Genitourinary: Negative for decreased urine volume, difficulty urinating, dysuria and frequency  Musculoskeletal: Negative for back pain, neck pain and neck stiffness  Skin: Negative for color change and rash  Allergic/Immunologic: Negative for environmental allergies and immunocompromised state  Neurological: Negative for dizziness and headaches  Hematological: Negative for adenopathy  Does not bruise/bleed easily  Psychiatric/Behavioral: Negative for dysphoric mood  The patient is not nervous/anxious  Physical Exam  Physical Exam   Constitutional: She is oriented to person, place, and time  She appears well-developed and well-nourished  No distress  HENT:   Head: Normocephalic and atraumatic  Nose: Nose normal    Eyes: Pupils are equal, round, and reactive to light  Conjunctivae and EOM are normal  No scleral icterus  Neck: Normal range of motion  Neck supple  No JVD present  No tracheal deviation present  No thyromegaly present  Cardiovascular: Normal rate, regular rhythm, normal heart sounds and intact distal pulses  Exam reveals no gallop and no friction rub  Pulmonary/Chest: Effort normal and breath sounds normal  No respiratory distress  She has no wheezes  She has no rales  She exhibits no tenderness  Abdominal: Soft  Bowel sounds are normal  She exhibits no distension and no mass  There is no tenderness  There is no rebound and no guarding  No hernia  Musculoskeletal: Normal range of motion  She exhibits no edema, tenderness or deformity  Neurological: She is alert and oriented to person, place, and time  She has normal reflexes  No cranial nerve deficit  Coordination normal    Skin: Skin is warm and dry  She is not diaphoretic  No erythema  Psychiatric: She has a normal mood and affect  Her behavior is normal    Nursing note and vitals reviewed        Vital Signs  ED Triage Vitals   Temp Pulse Respirations Blood Pressure SpO2   -- 02/07/20 1345 02/07/20 1345 02/07/20 1345 02/07/20 1345    75 18 120/89 97 %      Temp src Heart Rate Source Patient Position - Orthostatic VS BP Location FiO2 (%)   -- 02/07/20 1345 02/07/20 1345 02/07/20 1345 --    Monitor Lying Right arm       Pain Score       02/07/20 1334       6           Vitals:    02/07/20 1345 02/07/20 1400 02/07/20 1430   BP: 120/89 114/76 112/69   Pulse: 75 75 76 Patient Position - Orthostatic VS: Lying Lying Lying         Visual Acuity      ED Medications  Medications   potassium chloride (K-DUR,KLOR-CON) CR tablet 40 mEq (40 mEq Oral Given 2/7/20 1436)       Diagnostic Studies  Results Reviewed     Procedure Component Value Units Date/Time    Comprehensive metabolic panel [[id number]]  (Abnormal) Collected:  02/07/20 1348    Lab Status:  Final result Specimen:  Blood from Arm, Right Updated:  02/07/20 1421     Sodium 143 mmol/L      Potassium 3 2 mmol/L      Chloride 105 mmol/L      CO2 29 mmol/L      ANION GAP 9 mmol/L      BUN 14 mg/dL      Creatinine 0 65 mg/dL      Glucose 79 mg/dL      Calcium 8 9 mg/dL      AST 12 U/L      ALT 15 U/L      Alkaline Phosphatase 58 U/L      Total Protein 7 3 g/dL      Albumin 4 0 g/dL      Total Bilirubin 0 20 mg/dL      eGFR 115 ml/min/1 73sq m     Narrative:       [place] guidelines for Chronic Kidney Disease (CKD):     Stage 1 with normal or high GFR (GFR > 90 mL/min/1 73 square meters)    Stage 2 Mild CKD (GFR = 60-89 mL/min/1 73 square meters)    Stage 3A Moderate CKD (GFR = 45-59 mL/min/1 73 square meters)    Stage 3B Moderate CKD (GFR = 30-44 mL/min/1 73 square meters)    Stage 4 Severe CKD (GFR = 15-29 mL/min/1 73 square meters)    Stage 5 End Stage CKD (GFR <15 mL/min/1 73 square meters)  Note: GFR calculation is accurate only with a steady state creatinine    Troponin I [[id number]]  (Normal) Collected:  02/07/20 1348    Lab Status:  Final result Specimen:  Blood from Arm, Right Updated:  02/07/20 1419     Troponin I <0 02 ng/mL     CBC and differential [[id number]] Collected:  02/07/20 1348    Lab Status:  Final result Specimen:  Blood from Arm, Right Updated:  02/07/20 1357     WBC 9 34 Thousand/uL      RBC 4 60 Million/uL      Hemoglobin 13 8 g/dL      Hematocrit 40 9 %      MCV 89 fL      MCH 30 0 pg      MCHC 33 7 g/dL      RDW 13 3 %      MPV 10 1 fL      Platelets 164 Thousands/uL nRBC 0 /100 WBCs      Neutrophils Relative 58 %      Immat GRANS % 0 %      Lymphocytes Relative 35 %      Monocytes Relative 5 %      Eosinophils Relative 2 %      Basophils Relative 0 %      Neutrophils Absolute 5 40 Thousands/µL      Immature Grans Absolute 0 03 Thousand/uL      Lymphocytes Absolute 3 26 Thousands/µL      Monocytes Absolute 0 46 Thousand/µL      Eosinophils Absolute 0 15 Thousand/µL      Basophils Absolute 0 04 Thousands/µL                  No orders to display              Procedures  Procedures         ED Course         HEART Risk Score      Most Recent Value   History  0 Filed at: 02/09/2020 1010   ECG  0 Filed at: 02/09/2020 1010   Age  0 Filed at: 02/09/2020 1010   Risk Factors  0 Filed at: 02/09/2020 1010   Troponin  0 Filed at: 02/09/2020 1010   Heart Score Risk Calculator   History  0 Filed at: 02/09/2020 1010   ECG  0 Filed at: 02/09/2020 1010   Age  0 Filed at: 02/09/2020 1010   Risk Factors  0 Filed at: 02/09/2020 1010   Troponin  0 Filed at: 02/09/2020 1010   HEART Score  0 Filed at: 02/09/2020 1010   HEART Score  0 Filed at: 02/09/2020 1010                            MDM  Number of Diagnoses or Management Options  Atypical chest pain: new and requires workup  PVC's (premature ventricular contractions): new and requires workup  Diagnosis management comments: Patient was noted to have infrequent PVCs on telemetry  Could be a contributing factor    Will follow up with her primary care physician hypokalemia was repleted       Amount and/or Complexity of Data Reviewed  Clinical lab tests: reviewed and ordered  Tests in the radiology section of CPT®: reviewed and ordered  Tests in the medicine section of CPT®: ordered and reviewed          Disposition  Final diagnoses:   Atypical chest pain   PVC's (premature ventricular contractions)   Hypokalemia     Time reflects when diagnosis was documented in both MDM as applicable and the Disposition within this note     Time User Action Codes Description Comment    2/7/2020  2:59 PM Ethelene Peels Add [R07 89] Atypical chest pain     2/7/2020  3:25 PM Ethelene Peels Add [I49 3] PVC's (premature ventricular contractions)     2/9/2020 10:10 AM Ethelene Peels Add [E87 6] Hypokalemia       ED Disposition     ED Disposition Condition Date/Time Comment    Discharge Stable Fri Feb 7, 2020  2:59 PM Estrella Patrick discharge to home/self care  Follow-up Information     Follow up With Specialties Details Why Contact Info    Radha Tilley DO Family Medicine Schedule an appointment as soon as possible for a visit   [address] [place] [address],[address]  [address] [facility], [address]  [phone]            Discharge Medication List as of 2/7/2020  3:25 PM      CONTINUE these medications which have NOT CHANGED    Details   ACIDOPHILUS LACTOBACILLUS PO Take one tablet by mouth daily for supplement , Historical Med      ALPRAZolam (XANAX) 0 5 mg tablet Take by mouth, Starting Tue 8/22/2017, Historical Med      cyclobenzaprine (FLEXERIL) 10 mg tablet TAKE 1 TABLET EVERY 8 HOURS AS NEEDED MUSCLE SPASMS, Historical Med      estradiol (ESTRACE) 0 1 mg/g vaginal cream Apply thin film to affected area 2 times per day for one week, then nightly, Normal      fluconazole (DIFLUCAN) 150 mg tablet One daily x 5 days, Historical Med      hydrOXYzine HCL (ATARAX) 25 mg tablet Take 1 tablet (25 mg total) by mouth every 6 (six) hours as needed for itching, Starting Fri 5/24/2019, Normal      Multiple Vitamin (MULTIVITAMIN) tablet Take 1 tablet by mouth daily, Historical Med      nystatin-triamcinolone (MYCOLOG-II) cream Apply topically 2 (two) times a day for 14 days, Starting Fri 5/24/2019, Until Fri 6/7/2019, Normal      ondansetron (ZOFRAN-ODT) 4 mg disintegrating tablet    TAKE 1-2 TABS BY MOUTH EVERY 8 HOURS AS NEEDED FOR NAUSEA/VOMITING, Historical Med      rizatriptan (MAXALT-MLT) 10 MG disintegrating tablet Take 10 mg by mouth, Starting Fri 2/2/2018, Until Tue 4/30/2019, Historical Med      sertraline (ZOLOFT) 50 mg tablet 1/2TAB EACH EVENING FOR A WEEK THEN GOTO 1TAB THEREAFTER FOR MOOD, Historical Med      terconazole (TERAZOL 3) 0 8 % vaginal cream Apply daily as directed for yeast vaginitis, Historical Med           No discharge procedures on file      ED Provider  Electronically Signed by           Grupo Trujillo DO  02/09/20 1019

## 2021-03-26 ENCOUNTER — TELEPHONE (OUTPATIENT)
Dept: PSYCHIATRY | Facility: CLINIC | Age: 36
End: 2021-03-26

## 2022-08-24 ENCOUNTER — APPOINTMENT (EMERGENCY)
Dept: CT IMAGING | Facility: HOSPITAL | Age: 37
End: 2022-08-24
Payer: COMMERCIAL

## 2022-08-24 ENCOUNTER — HOSPITAL ENCOUNTER (EMERGENCY)
Facility: HOSPITAL | Age: 37
Discharge: HOME | End: 2022-08-24
Attending: EMERGENCY MEDICINE
Payer: COMMERCIAL

## 2022-08-24 VITALS
DIASTOLIC BLOOD PRESSURE: 55 MMHG | HEIGHT: 64 IN | SYSTOLIC BLOOD PRESSURE: 98 MMHG | HEART RATE: 85 BPM | RESPIRATION RATE: 14 BRPM | OXYGEN SATURATION: 99 % | TEMPERATURE: 98.4 F | WEIGHT: 140 LBS | BODY MASS INDEX: 23.9 KG/M2

## 2022-08-24 DIAGNOSIS — R11.2 NAUSEA AND VOMITING: Primary | ICD-10-CM

## 2022-08-24 DIAGNOSIS — R53.83 FATIGUE: ICD-10-CM

## 2022-08-24 DIAGNOSIS — R51.9 HEADACHE: ICD-10-CM

## 2022-08-24 DIAGNOSIS — G43.909 MIGRAINES: ICD-10-CM

## 2022-08-24 LAB
ALBUMIN SERPL BCP-MCNC: 3.7 G/DL (ref 3.5–5)
ALP SERPL-CCNC: 68 U/L (ref 46–116)
ALT SERPL W P-5'-P-CCNC: 23 U/L (ref 12–78)
ANION GAP SERPL CALCULATED.3IONS-SCNC: 7 MMOL/L (ref 4–13)
AST SERPL W P-5'-P-CCNC: 13 U/L (ref 5–45)
BASOPHILS # BLD AUTO: 0.01 THOUSANDS/ΜL (ref 0–0.1)
BASOPHILS NFR BLD AUTO: 0 % (ref 0–1)
BILIRUB SERPL-MCNC: 0.2 MG/DL (ref 0.2–1)
BILIRUB UR QL STRIP: NEGATIVE
BUN SERPL-MCNC: 12 MG/DL (ref 5–25)
CALCIUM SERPL-MCNC: 8.3 MG/DL (ref 8.3–10.1)
CHLORIDE SERPL-SCNC: 104 MMOL/L (ref 96–108)
CLARITY UR: CLEAR
CO2 SERPL-SCNC: 29 MMOL/L (ref 21–32)
COLOR UR: YELLOW
CREAT SERPL-MCNC: 0.76 MG/DL (ref 0.6–1.3)
EOSINOPHIL # BLD AUTO: 0.21 THOUSAND/ΜL (ref 0–0.61)
EOSINOPHIL NFR BLD AUTO: 3 % (ref 0–6)
ERYTHROCYTE [DISTWIDTH] IN BLOOD BY AUTOMATED COUNT: 12.2 % (ref 11.6–15.1)
EXT PREG TEST URINE: NEGATIVE
EXT. CONTROL ED NAV: NORMAL
GFR SERPL CREATININE-BSD FRML MDRD: 99 ML/MIN/1.73SQ M
GLUCOSE SERPL-MCNC: 88 MG/DL (ref 65–140)
GLUCOSE UR STRIP-MCNC: NEGATIVE MG/DL
HCT VFR BLD AUTO: 42.7 % (ref 34.8–46.1)
HGB BLD-MCNC: 14.5 G/DL (ref 11.5–15.4)
HGB UR QL STRIP.AUTO: NEGATIVE
IMM GRANULOCYTES # BLD AUTO: 0.02 THOUSAND/UL (ref 0–0.2)
IMM GRANULOCYTES NFR BLD AUTO: 0 % (ref 0–2)
KETONES UR STRIP-MCNC: NEGATIVE MG/DL
LEUKOCYTE ESTERASE UR QL STRIP: NEGATIVE
LIPASE SERPL-CCNC: 114 U/L (ref 73–393)
LYMPHOCYTES # BLD AUTO: 1.94 THOUSANDS/ΜL (ref 0.6–4.47)
LYMPHOCYTES NFR BLD AUTO: 24 % (ref 14–44)
MCH RBC QN AUTO: 30.5 PG (ref 26.8–34.3)
MCHC RBC AUTO-ENTMCNC: 34 G/DL (ref 31.4–37.4)
MCV RBC AUTO: 90 FL (ref 82–98)
MONOCYTES # BLD AUTO: 0.49 THOUSAND/ΜL (ref 0.17–1.22)
MONOCYTES NFR BLD AUTO: 6 % (ref 4–12)
NEUTROPHILS # BLD AUTO: 5.41 THOUSANDS/ΜL (ref 1.85–7.62)
NEUTS SEG NFR BLD AUTO: 67 % (ref 43–75)
NITRITE UR QL STRIP: NEGATIVE
NRBC BLD AUTO-RTO: 0 /100 WBCS
PH UR STRIP.AUTO: 7 [PH]
PLATELET # BLD AUTO: 260 THOUSANDS/UL (ref 149–390)
PMV BLD AUTO: 9.9 FL (ref 8.9–12.7)
POTASSIUM SERPL-SCNC: 3.7 MMOL/L (ref 3.5–5.3)
PROT SERPL-MCNC: 6.7 G/DL (ref 6.4–8.4)
PROT UR STRIP-MCNC: NEGATIVE MG/DL
RBC # BLD AUTO: 4.75 MILLION/UL (ref 3.81–5.12)
SODIUM SERPL-SCNC: 140 MMOL/L (ref 135–147)
SP GR UR STRIP.AUTO: <=1.005 (ref 1–1.03)
TSH SERPL DL<=0.05 MIU/L-ACNC: 0.78 UIU/ML (ref 0.45–4.5)
UROBILINOGEN UR QL STRIP.AUTO: 0.2 E.U./DL
WBC # BLD AUTO: 8.08 THOUSAND/UL (ref 4.31–10.16)

## 2022-08-24 PROCEDURE — 36415 COLL VENOUS BLD VENIPUNCTURE: CPT | Performed by: EMERGENCY MEDICINE

## 2022-08-24 PROCEDURE — 81003 URINALYSIS AUTO W/O SCOPE: CPT

## 2022-08-24 PROCEDURE — 85025 COMPLETE CBC W/AUTO DIFF WBC: CPT | Performed by: EMERGENCY MEDICINE

## 2022-08-24 PROCEDURE — 83690 ASSAY OF LIPASE: CPT | Performed by: EMERGENCY MEDICINE

## 2022-08-24 PROCEDURE — 80053 COMPREHEN METABOLIC PANEL: CPT | Performed by: EMERGENCY MEDICINE

## 2022-08-24 PROCEDURE — G1004 CDSM NDSC: HCPCS

## 2022-08-24 PROCEDURE — 96361 HYDRATE IV INFUSION ADD-ON: CPT

## 2022-08-24 PROCEDURE — 99284 EMERGENCY DEPT VISIT MOD MDM: CPT

## 2022-08-24 PROCEDURE — 96375 TX/PRO/DX INJ NEW DRUG ADDON: CPT

## 2022-08-24 PROCEDURE — 96374 THER/PROPH/DIAG INJ IV PUSH: CPT

## 2022-08-24 PROCEDURE — 84443 ASSAY THYROID STIM HORMONE: CPT | Performed by: EMERGENCY MEDICINE

## 2022-08-24 PROCEDURE — 99284 EMERGENCY DEPT VISIT MOD MDM: CPT | Performed by: EMERGENCY MEDICINE

## 2022-08-24 PROCEDURE — 81025 URINE PREGNANCY TEST: CPT | Performed by: EMERGENCY MEDICINE

## 2022-08-24 PROCEDURE — 70450 CT HEAD/BRAIN W/O DYE: CPT

## 2022-08-24 RX ORDER — METOCLOPRAMIDE HYDROCHLORIDE 5 MG/ML
10 INJECTION INTRAMUSCULAR; INTRAVENOUS ONCE
Status: COMPLETED | OUTPATIENT
Start: 2022-08-24 | End: 2022-08-24

## 2022-08-24 RX ORDER — METOCLOPRAMIDE 10 MG/1
10 TABLET ORAL EVERY 6 HOURS PRN
Qty: 12 TABLET | Refills: 0 | Status: SHIPPED | OUTPATIENT
Start: 2022-08-24

## 2022-08-24 RX ORDER — KETOROLAC TROMETHAMINE 30 MG/ML
15 INJECTION, SOLUTION INTRAMUSCULAR; INTRAVENOUS ONCE
Status: COMPLETED | OUTPATIENT
Start: 2022-08-24 | End: 2022-08-24

## 2022-08-24 RX ADMIN — KETOROLAC TROMETHAMINE 15 MG: 30 INJECTION, SOLUTION INTRAMUSCULAR; INTRAVENOUS at 11:50

## 2022-08-24 RX ADMIN — SODIUM CHLORIDE 1000 ML: 0.9 INJECTION, SOLUTION INTRAVENOUS at 11:50

## 2022-08-24 RX ADMIN — METOCLOPRAMIDE 10 MG: 5 INJECTION, SOLUTION INTRAMUSCULAR; INTRAVENOUS at 11:50

## 2022-08-24 NOTE — ED PROVIDER NOTES
History  Chief Complaint   Patient presents with    Vomiting     Migraine started on 8/16/22, since has been +n/+v/+exhausted  Patient is a 42year old female with a past medical history significant for migraines, depression who presents with headache and generalized weakness  Patient reports that she developed a headache on 8/16/22 that lasted for 1 week  Describes headache as all over the head, moderate in intensity, persistent, throbbing and pressure like in nature, associated with nausea, a few episodes of non-bloody, non-bilious emesis, generalized weakness/ fatigue, and lightheadedness  Reports that typically her headache are over both temples, a few days at most, and if the fatigue is present- it resolves in a day or 2, not lasting a week  Denies any fevers, chills, vision changes, dizziness, neck pain of stiffness, chest pain, abdominal pain, numbness, weakness  Prior to Admission Medications   Prescriptions Last Dose Informant Patient Reported? Taking? ACIDOPHILUS LACTOBACILLUS PO  Self Yes No   Sig: Take one tablet by mouth daily for supplement    ALPRAZolam (XANAX) 0 5 mg tablet  Self Yes No   Sig: Take by mouth   Multiple Vitamin (MULTIVITAMIN) tablet  Self Yes No   Sig: Take 1 tablet by mouth daily   cyclobenzaprine (FLEXERIL) 10 mg tablet  Self Yes No   Sig: TAKE 1 TABLET EVERY 8 HOURS AS NEEDED MUSCLE SPASMS   estradiol (ESTRACE) 0 1 mg/g vaginal cream  Self No No   Sig: Apply thin film to affected area 2 times per day for one week, then nightly   Patient not taking: Reported on 4/30/2019   fluconazole (DIFLUCAN) 150 mg tablet  Self Yes No   Sig: One daily x 5 days   hydrOXYzine HCL (ATARAX) 25 mg tablet   No No   Sig: Take 1 tablet (25 mg total) by mouth every 6 (six) hours as needed for itching   nystatin-triamcinolone (MYCOLOG-II) cream   No No   Sig: Apply topically 2 (two) times a day for 14 days   ondansetron (ZOFRAN-ODT) 4 mg disintegrating tablet  Self Yes No   Sig:    TAKE 1-2 TABS BY MOUTH EVERY 8 HOURS AS NEEDED FOR NAUSEA/VOMITING   rizatriptan (MAXALT-MLT) 10 MG disintegrating tablet  Self Yes No   Sig: Take 10 mg by mouth   sertraline (ZOLOFT) 50 mg tablet  Self Yes No   Si/2TAB EACH EVENING FOR A WEEK THEN GOTO 1TAB THEREAFTER FOR MOOD   terconazole (TERAZOL 3) 0 8 % vaginal cream  Self Yes No   Sig: Apply daily as directed for yeast vaginitis      Facility-Administered Medications: None       Past Medical History:   Diagnosis Date    Anxiety     Candidal vulvitis 2017    Depression     GERD (gastroesophageal reflux disease)     History of ITP     as teen    Migraine     Normal delivery      daughter   Seb Meyers Urinary retention 2016    Varicella        Past Surgical History:   Procedure Laterality Date    OK LAP,RMV  ADNEXAL STRUCTURE Bilateral 2017    bilateral salpingectomy    TONSILLECTOMY      TUBAL LIGATION      fallopian removal    WISDOM TOOTH EXTRACTION         Family History   Problem Relation Age of Onset    Heart defect Daughter         ALCAPA    ALS Maternal Grandmother     Heart block Maternal Grandmother      I have reviewed and agree with the history as documented  E-Cigarette/Vaping    E-Cigarette Use Never User      E-Cigarette/Vaping Substances    Nicotine No     THC No     CBD No     Flavoring No     Other No     Unknown No      Social History     Tobacco Use    Smoking status: Never Smoker    Smokeless tobacco: Never Used   Vaping Use    Vaping Use: Never used   Substance Use Topics    Alcohol use: Yes     Comment: wine rare    Drug use: No       Review of Systems   Constitutional: Positive for fatigue  Negative for chills and fever  HENT: Negative for congestion and rhinorrhea  Eyes: Positive for photophobia  Negative for visual disturbance  Respiratory: Negative for cough and shortness of breath  Cardiovascular: Negative for chest pain and palpitations     Gastrointestinal: Positive for nausea and vomiting  Negative for abdominal pain, constipation and diarrhea  Genitourinary: Negative for dysuria, flank pain and hematuria  Musculoskeletal: Negative for back pain, neck pain and neck stiffness  Skin: Negative for color change and pallor  Neurological: Positive for headaches  Negative for dizziness, weakness, light-headedness and numbness  Psychiatric/Behavioral: Negative for confusion  Physical Exam  Physical Exam  Vitals and nursing note reviewed  Constitutional:       General: She is not in acute distress  Appearance: Normal appearance  She is not ill-appearing, toxic-appearing or diaphoretic  HENT:      Head: Normocephalic and atraumatic  Mouth/Throat:      Mouth: Mucous membranes are moist    Eyes:      General: Vision grossly intact  No visual field deficit  Extraocular Movements: Extraocular movements intact  Conjunctiva/sclera: Conjunctivae normal       Pupils: Pupils are equal, round, and reactive to light  Funduscopic exam:     Right eye: No AV nicking  Left eye: No AV nicking  Cardiovascular:      Rate and Rhythm: Normal rate and regular rhythm  Pulses: Normal pulses  Heart sounds: Normal heart sounds  No murmur heard  Pulmonary:      Effort: Pulmonary effort is normal  No respiratory distress  Breath sounds: Normal breath sounds  No stridor  No wheezing, rhonchi or rales  Chest:      Chest wall: No tenderness  Abdominal:      General: Bowel sounds are normal  There is no distension  Palpations: Abdomen is soft  Tenderness: There is no abdominal tenderness  There is no guarding or rebound  Musculoskeletal:      Cervical back: Normal range of motion and neck supple  No rigidity or tenderness  Right lower leg: No edema  Left lower leg: No edema  Lymphadenopathy:      Cervical: No cervical adenopathy  Skin:     General: Skin is warm and dry  Neurological:      General: No focal deficit present  Mental Status: She is alert and oriented to person, place, and time  Mental status is at baseline  GCS: GCS eye subscore is 4  GCS verbal subscore is 5  GCS motor subscore is 6  Cranial Nerves: Cranial nerves are intact  No cranial nerve deficit, dysarthria or facial asymmetry  Sensory: Sensation is intact  Motor: Motor function is intact  No weakness, abnormal muscle tone or pronator drift  Coordination: Coordination is intact  Finger-Nose-Finger Test normal       Gait: Gait is intact   Gait normal    Psychiatric:         Mood and Affect: Mood normal          Behavior: Behavior normal          Vital Signs  ED Triage Vitals [08/24/22 1051]   Temperature Pulse Respirations Blood Pressure SpO2   98 4 °F (36 9 °C) 79 16 122/76 99 %      Temp Source Heart Rate Source Patient Position - Orthostatic VS BP Location FiO2 (%)   Temporal Monitor Sitting Left arm --      Pain Score       No Pain           Vitals:    08/24/22 1051 08/24/22 1200 08/24/22 1300   BP: 122/76  98/55   Pulse: 79 66 64   Patient Position - Orthostatic VS: Sitting           Visual Acuity      ED Medications  Medications   sodium chloride 0 9 % bolus 1,000 mL (0 mL Intravenous Stopped 8/24/22 1309)   ketorolac (TORADOL) injection 15 mg (15 mg Intravenous Given 8/24/22 1150)   metoclopramide (REGLAN) injection 10 mg (10 mg Intravenous Given 8/24/22 1150)       Diagnostic Studies  Results Reviewed     Procedure Component Value Units Date/Time    UA w Reflex to Microscopic w Reflex to Culture [[id number]] Collected: 08/24/22 1205    Lab Status: Final result Specimen: Urine, Clean Catch Updated: 08/24/22 1245     Color, UA Yellow     Clarity, UA Clear     Specific Gravity, UA <=1 005     pH, UA 7 0     Leukocytes, UA Negative     Nitrite, UA Negative     Protein, UA Negative mg/dl      Glucose, UA Negative mg/dl      Ketones, UA Negative mg/dl      Urobilinogen, UA 0 2 E U /dl      Bilirubin, UA Negative     Occult Blood, UA Negative TSH [[id number]]  (Normal) Collected: 08/24/22 1151    Lab Status: Final result Specimen: Blood from Arm, Right Updated: 08/24/22 1238     TSH 3RD GENERATON 0 784 uIU/mL     Narrative:      Patients undergoing fluorescein dye angiography may retain small amounts of fluorescein in the body for 48-72 hours post procedure  Samples containing fluorescein can produce falsely depressed TSH values  If the patient had this procedure,a specimen should be resubmitted post fluorescein clearance        Comprehensive metabolic panel [[id number]] Collected: 08/24/22 1152    Lab Status: Final result Specimen: Blood from Arm, Right Updated: 08/24/22 1228     Sodium 140 mmol/L      Potassium 3 7 mmol/L      Chloride 104 mmol/L      CO2 29 mmol/L      ANION GAP 7 mmol/L      BUN 12 mg/dL      Creatinine 0 76 mg/dL      Glucose 88 mg/dL      Calcium 8 3 mg/dL      AST 13 U/L      ALT 23 U/L      Alkaline Phosphatase 68 U/L      Total Protein 6 7 g/dL      Albumin 3 7 g/dL      Total Bilirubin 0 20 mg/dL      eGFR 99 ml/min/1 73sq m     Narrative:      Christina guidelines for Chronic Kidney Disease (CKD):     Stage 1 with normal or high GFR (GFR > 90 mL/min/1 73 square meters)    Stage 2 Mild CKD (GFR = 60-89 mL/min/1 73 square meters)    Stage 3A Moderate CKD (GFR = 45-59 mL/min/1 73 square meters)    Stage 3B Moderate CKD (GFR = 30-44 mL/min/1 73 square meters)    Stage 4 Severe CKD (GFR = 15-29 mL/min/1 73 square meters)    Stage 5 End Stage CKD (GFR <15 mL/min/1 73 square meters)  Note: GFR calculation is accurate only with a steady state creatinine    Lipase [[id number]]  (Normal) Collected: 08/24/22 1152    Lab Status: Final result Specimen: Blood from Arm, Right Updated: 08/24/22 1228     Lipase 114 u/L     CBC and differential [[id number]] Collected: 08/24/22 1151    Lab Status: Final result Specimen: Blood from Arm, Right Updated: 08/24/22 1221     WBC 8 08 Thousand/uL      RBC 4 75 Million/uL Hemoglobin 14 5 g/dL      Hematocrit 42 7 %      MCV 90 fL      MCH 30 5 pg      MCHC 34 0 g/dL      RDW 12 2 %      MPV 9 9 fL      Platelets 287 Thousands/uL      nRBC 0 /100 WBCs      Neutrophils Relative 67 %      Immat GRANS % 0 %      Lymphocytes Relative 24 %      Monocytes Relative 6 %      Eosinophils Relative 3 %      Basophils Relative 0 %      Neutrophils Absolute 5 41 Thousands/µL      Immature Grans Absolute 0 02 Thousand/uL      Lymphocytes Absolute 1 94 Thousands/µL      Monocytes Absolute 0 49 Thousand/µL      Eosinophils Absolute 0 21 Thousand/µL      Basophils Absolute 0 01 Thousands/µL     POCT pregnancy, urine [[id number]]  (Normal) Resulted: 08/24/22 1203    Lab Status: Final result Updated: 08/24/22 1204     EXT PREG TEST UR (Ref: Negative) negative     Control valid    POCT urinalysis dipstick [[id number]]     Lab Status: No result Specimen: Urine                  CT head without contrast   Final Result by Ginny Correa MD (08/24 1202)      No acute intracranial abnormality  Workstation performed: [id number]                    Procedures  Procedures         ED Course  ED Course as of 08/24/22 1317   Wed Aug 24, 2022   1315 Patient feels improved s/p migraine cocktail  Workup negative  Discussed follow up with PCP and neuro for headaches and given strict return precautions which patient verbalized understanding of  SBIRT 20yo+    Flowsheet Row Most Recent Value   SBIRT (25 yo +)    In order to provide better care to our patients, we are screening all of our patients for alcohol and drug use  Would it be okay to ask you these screening questions? No Filed at: 08/24/2022 1057                    MDM  Number of Diagnoses or Management Options  Diagnosis management comments: Assessment and Plan:   42year old female who presents with headache + generalized weakness  Will treat symptomatically   Check labs for electrolyte abnormalities/ dehydration , thyroid function, anemia, r/o pregnancy  Get ct head to rule out bleed/lesion as this is not a typical migraine for the patient  Disposition  Final diagnoses:   Nausea and vomiting   Headache   Fatigue   Migraines     Time reflects when diagnosis was documented in both MDM as applicable and the Disposition within this note     Time User Action Codes Description Comment    8/24/2022 12:51 PM Aracelis Hillock Add [R11 2] Nausea and vomiting     8/24/2022 12:51 PM Aracelis Hillock Add [R51 9] Headache     8/24/2022 12:51 PM Aracelis Hillock Add [R53 83] Fatigue     8/24/2022  1:16 PM Aracelis Hillock Add [J50 712] Migraines       ED Disposition     ED Disposition   Discharge    Condition   Stable    Date/Time   Wed Aug 24, 2022 12:51 PM    [address] discharge to home/self care                 Follow-up Information     Follow up With Specialties Details Why Contact Info Additional Information    Dionicio Christiansen DO Family Medicine Schedule an appointment as soon as possible for a visit in 3 days for re-evaluation [id number] [place] [address],[address]  [address]  Ksenia Jones [facility] Neurology Schedule an appointment as soon as possible for a visit in 1 week for re-evaluation [phone] [id number] [facility] [id number]  [address] [facility] Neurology [facility], [address], [address] [place] [address], [place], [place], [address]     [address] Emergency Department Emergency Medicine Go to  As needed, If symptoms worsen, for re-evaluation [address] [place] [id number]  [address] [facility] Emergency Department, 600 [facility] [address]          Patient's Medications   Discharge Prescriptions    METOCLOPRAMIDE (REGLAN) 10 MG TABLET    Take 1 tablet (10 mg total) by mouth every 6 (six) hours as needed (vomiting)       Start Date: 8/24/2022 End Date: --       Order Dose: 10 mg       Quantity: 12 tablet    Refills: 0           PDMP Review     None          ED Provider  Electronically Signed by           Korin Singleton DO  08/24/22 6564

## 2022-09-16 ENCOUNTER — TELEPHONE (OUTPATIENT)
Dept: NEUROLOGY | Facility: CLINIC | Age: 37
End: 2022-09-16

## 2024-02-21 PROBLEM — Z01.419 ENCOUNTER FOR GYNECOLOGICAL EXAMINATION (GENERAL) (ROUTINE) WITHOUT ABNORMAL FINDINGS: Status: RESOLVED | Noted: 2018-07-26 | Resolved: 2024-02-21

## 2025-02-01 ENCOUNTER — APPOINTMENT (EMERGENCY)
Dept: RADIOLOGY | Facility: HOSPITAL | Age: 40
End: 2025-02-01
Payer: COMMERCIAL

## 2025-02-01 ENCOUNTER — HOSPITAL ENCOUNTER (EMERGENCY)
Facility: HOSPITAL | Age: 40
Discharge: HOME | End: 2025-02-01
Attending: EMERGENCY MEDICINE | Admitting: EMERGENCY MEDICINE
Payer: COMMERCIAL

## 2025-02-01 VITALS
RESPIRATION RATE: 18 BRPM | OXYGEN SATURATION: 98 % | DIASTOLIC BLOOD PRESSURE: 84 MMHG | TEMPERATURE: 98 F | SYSTOLIC BLOOD PRESSURE: 128 MMHG | HEART RATE: 75 BPM

## 2025-02-01 DIAGNOSIS — R07.9 CHEST PAIN: Primary | ICD-10-CM

## 2025-02-01 DIAGNOSIS — R10.13 ACUTE EPIGASTRIC PAIN: ICD-10-CM

## 2025-02-01 LAB
ALBUMIN SERPL BCG-MCNC: 4.3 G/DL (ref 3.5–5)
ALP SERPL-CCNC: 55 U/L (ref 34–104)
ALT SERPL W P-5'-P-CCNC: 11 U/L (ref 7–52)
ANION GAP SERPL CALCULATED.3IONS-SCNC: 8 MMOL/L (ref 4–13)
AST SERPL W P-5'-P-CCNC: 12 U/L (ref 13–39)
BASOPHILS # BLD AUTO: 0.04 THOUSANDS/ΜL (ref 0–0.1)
BASOPHILS NFR BLD AUTO: 0 % (ref 0–1)
BILIRUB SERPL-MCNC: 0.25 MG/DL (ref 0.2–1)
BUN SERPL-MCNC: 12 MG/DL (ref 5–25)
CALCIUM SERPL-MCNC: 9.3 MG/DL (ref 8.4–10.2)
CARDIAC TROPONIN I PNL SERPL HS: <2 NG/L (ref ?–50)
CHLORIDE SERPL-SCNC: 106 MMOL/L (ref 96–108)
CO2 SERPL-SCNC: 25 MMOL/L (ref 21–32)
CREAT SERPL-MCNC: 0.71 MG/DL (ref 0.6–1.3)
EOSINOPHIL # BLD AUTO: 0.21 THOUSAND/ΜL (ref 0–0.61)
EOSINOPHIL NFR BLD AUTO: 2 % (ref 0–6)
ERYTHROCYTE [DISTWIDTH] IN BLOOD BY AUTOMATED COUNT: 13.1 % (ref 11.6–15.1)
GFR SERPL CREATININE-BSD FRML MDRD: 106 ML/MIN/1.73SQ M
GLUCOSE SERPL-MCNC: 94 MG/DL (ref 65–140)
GLUCOSE SERPL-MCNC: 95 MG/DL (ref 65–140)
HCT VFR BLD AUTO: 40.6 % (ref 34.8–46.1)
HGB BLD-MCNC: 13.6 G/DL (ref 11.5–15.4)
IMM GRANULOCYTES # BLD AUTO: 0.03 THOUSAND/UL (ref 0–0.2)
IMM GRANULOCYTES NFR BLD AUTO: 0 % (ref 0–2)
LYMPHOCYTES # BLD AUTO: 2.63 THOUSANDS/ΜL (ref 0.6–4.47)
LYMPHOCYTES NFR BLD AUTO: 29 % (ref 14–44)
MCH RBC QN AUTO: 29.1 PG (ref 26.8–34.3)
MCHC RBC AUTO-ENTMCNC: 33.5 G/DL (ref 31.4–37.4)
MCV RBC AUTO: 87 FL (ref 82–98)
MONOCYTES # BLD AUTO: 0.53 THOUSAND/ΜL (ref 0.17–1.22)
MONOCYTES NFR BLD AUTO: 6 % (ref 4–12)
NEUTROPHILS # BLD AUTO: 5.56 THOUSANDS/ΜL (ref 1.85–7.62)
NEUTS SEG NFR BLD AUTO: 63 % (ref 43–75)
NRBC BLD AUTO-RTO: 0 /100 WBCS
PLATELET # BLD AUTO: 265 THOUSANDS/UL (ref 149–390)
PMV BLD AUTO: 9.9 FL (ref 8.9–12.7)
POTASSIUM SERPL-SCNC: 3.9 MMOL/L (ref 3.5–5.3)
PROT SERPL-MCNC: 6.6 G/DL (ref 6.4–8.4)
RBC # BLD AUTO: 4.67 MILLION/UL (ref 3.81–5.12)
SODIUM SERPL-SCNC: 139 MMOL/L (ref 135–147)
TSH SERPL DL<=0.05 MIU/L-ACNC: 1.17 UIU/ML (ref 0.45–4.5)
WBC # BLD AUTO: 9 THOUSAND/UL (ref 4.31–10.16)

## 2025-02-01 PROCEDURE — 99285 EMERGENCY DEPT VISIT HI MDM: CPT

## 2025-02-01 PROCEDURE — 93005 ELECTROCARDIOGRAM TRACING: CPT

## 2025-02-01 PROCEDURE — 82948 REAGENT STRIP/BLOOD GLUCOSE: CPT

## 2025-02-01 PROCEDURE — 36415 COLL VENOUS BLD VENIPUNCTURE: CPT | Performed by: STUDENT IN AN ORGANIZED HEALTH CARE EDUCATION/TRAINING PROGRAM

## 2025-02-01 PROCEDURE — 85025 COMPLETE CBC W/AUTO DIFF WBC: CPT | Performed by: STUDENT IN AN ORGANIZED HEALTH CARE EDUCATION/TRAINING PROGRAM

## 2025-02-01 PROCEDURE — 96374 THER/PROPH/DIAG INJ IV PUSH: CPT

## 2025-02-01 PROCEDURE — 71046 X-RAY EXAM CHEST 2 VIEWS: CPT

## 2025-02-01 PROCEDURE — 99285 EMERGENCY DEPT VISIT HI MDM: CPT | Performed by: EMERGENCY MEDICINE

## 2025-02-01 PROCEDURE — 84484 ASSAY OF TROPONIN QUANT: CPT | Performed by: STUDENT IN AN ORGANIZED HEALTH CARE EDUCATION/TRAINING PROGRAM

## 2025-02-01 PROCEDURE — 84443 ASSAY THYROID STIM HORMONE: CPT | Performed by: STUDENT IN AN ORGANIZED HEALTH CARE EDUCATION/TRAINING PROGRAM

## 2025-02-01 PROCEDURE — 80053 COMPREHEN METABOLIC PANEL: CPT | Performed by: STUDENT IN AN ORGANIZED HEALTH CARE EDUCATION/TRAINING PROGRAM

## 2025-02-01 RX ORDER — FAMOTIDINE 20 MG/1
20 TABLET, FILM COATED ORAL 2 TIMES DAILY
Qty: 30 TABLET | Refills: 0 | Status: SHIPPED | OUTPATIENT
Start: 2025-02-01

## 2025-02-01 RX ORDER — FAMOTIDINE 10 MG/ML
20 INJECTION, SOLUTION INTRAVENOUS ONCE
Status: COMPLETED | OUTPATIENT
Start: 2025-02-01 | End: 2025-02-01

## 2025-02-01 RX ORDER — MAGNESIUM HYDROXIDE/ALUMINUM HYDROXICE/SIMETHICONE 120; 1200; 1200 MG/30ML; MG/30ML; MG/30ML
30 SUSPENSION ORAL ONCE
Status: COMPLETED | OUTPATIENT
Start: 2025-02-01 | End: 2025-02-01

## 2025-02-01 RX ADMIN — ALUMINUM HYDROXIDE, MAGNESIUM HYDROXIDE, AND DIMETHICONE 30 ML: 200; 20; 200 SUSPENSION ORAL at 17:26

## 2025-02-01 RX ADMIN — FAMOTIDINE 20 MG: 10 INJECTION INTRAVENOUS at 17:26

## 2025-02-01 NOTE — ED PROVIDER NOTES
Time reflects when diagnosis was documented in both MDM as applicable and the Disposition within this note       Time User Action Codes Description Comment    2/1/2025  5:43 PM Anup Segura Add [R07.9] Chest pain     2/1/2025  5:49 PM Anup Segura Add [R10.13] Acute epigastric pain           ED Disposition       ED Disposition   Discharge    Condition   Stable    Date/Time   Sat Feb 1, 2025  5:43 PM    Comment   Shyann Yates discharge to home/self care.                   Assessment & Plan       Medical Decision Making  Patient presents with:  Chest Pain: Pt states began with CP that radiates to the L side of her jaw around 1300. Pt reports took an antacid, but denies relief.   DDx includes ACS, dissection, GERD, anxiety, PNA, CTX, gastric ulcer, PE or other unknown process  Workup per ED course: workup reassuring, Heart score 1. Pt notes marked symptomatic improvement with GI cocktail and request discharge home with plan to follow-up with her PCP.  Recommend follow-up with GI/cardiology for additional workup  Discharged with GI and cardiology referrals.   Dispo: Workup and return precautions reviewed. Patient expresses understanding, is comfortable with discharge, aggress to follow-up as advised and return to ED if symptoms recur.     Amount and/or Complexity of Data Reviewed  Labs: ordered. Decision-making details documented in ED Course.  Radiology: ordered and independent interpretation performed.  ECG/medicine tests:  Decision-making details documented in ED Course.    Risk  OTC drugs.  Prescription drug management.        ED Course as of 02/06/25 1710   Sat Feb 01, 2025   1621 Chest pain with radiation to left jaw without associated nausea/vomiting.            1621 ECG 12 lead  ECG per my independent interpretation:   Normal rate, regular rhythm, normal axis,   CA/QRS/Qtc within normal limits  no ST elevations or depressions   Compared to prior ECG, no interval change   8932  Comprehensive metabolic panel(!)  No electrolyte derangement, anion gap, Transaminitis, NASH     1736 TSH 3RD GENERATON: 1.169   1736 WBC: 9.00   1736 Hemoglobin: 13.6   1736 hs TnI 0hr: <2  Symptomatic onset 1 PM.  Low suspicion of ACS   1740 Heart score 1     1825 Workup and finding reviewed. She notes marked symptomatic improvement and requests discharge home with plan to follow-up with her PCP.       Medications   Famotidine (PF) (PEPCID) injection 20 mg (20 mg Intravenous Given 2/1/25 1726)   aluminum-magnesium hydroxide-simethicone (MAALOX) oral suspension 30 mL (30 mL Oral Given 2/1/25 1726)       ED Risk Strat Scores   HEART Risk Score      Flowsheet Row Most Recent Value   Heart Score Risk Calculator    History 1 Filed at: 02/01/2025 1738   ECG 0 Filed at: 02/01/2025 1738   Age 0 Filed at: 02/01/2025 1738   Risk Factors 0 Filed at: 02/01/2025 1738   Troponin 0 Filed at: 02/01/2025 1738   HEART Score 1 Filed at: 02/01/2025 1738          HEART Risk Score      Flowsheet Row Most Recent Value   Heart Score Risk Calculator    History 1 Filed at: 02/01/2025 1738   ECG 0 Filed at: 02/01/2025 1738   Age 0 Filed at: 02/01/2025 1738   Risk Factors 0 Filed at: 02/01/2025 1738   Troponin 0 Filed at: 02/01/2025 1738   HEART Score 1 Filed at: 02/01/2025 1738                                                History of Present Illness       Chief Complaint   Patient presents with    Chest Pain     Pt states began with CP that radiates to the L side of her jaw around 1300. Pt reports took an antacid, but denies relief.        Past Medical History:   Diagnosis Date    Anxiety     Candidal vulvitis 7/19/2017    Depression     GERD (gastroesophageal reflux disease)     History of ITP     as teen    Migraine     Normal delivery     2016 daughter    Urinary retention 5/17/2016    Varicella       Past Surgical History:   Procedure Laterality Date    [place] LAPAROSCOPY W/RMVL ADNEXAL STRUCTURES Bilateral 8/21/2017    bilateral  salpingectomy    TONSILLECTOMY      TUBAL LIGATION      fallopian removal    WISDOM TOOTH EXTRACTION        Family History   Problem Relation Age of Onset    Heart defect Daughter         ALCAPA    ALS Maternal Grandmother     Heart block Maternal Grandmother       Social History     Tobacco Use    Smoking status: Never    Smokeless tobacco: Never   Vaping Use    Vaping status: Never Used   Substance Use Topics    Alcohol use: Yes     Comment: wine rare    Drug use: Yes     Types: Marijuana      E-Cigarette/Vaping    E-Cigarette Use Never User       E-Cigarette/Vaping Substances    Nicotine No     THC No     CBD No     Flavoring No     Other No     Unknown No       I have reviewed and agree with the history as documented.     Shyann Yates is a 39 y.o. female p/w Chest pain with radiation to left jaw without associated nausea/vomiting.  Onset approximately 1:30 PM.  Additionally notes epigastric discomfort unrelieved with Tums.  Notes prior similar substernal discomfort, however this episode worse with radiation to jaw feels distinctly different. Chest discomfort unrelated to inspiration, activity or position.  Denies inciting incident or traumatic injury.      Denies Fever/Chills, Nausea/vomiting, Headache/vision changes, SOB, hemoptysis/hematochezia, GI/ symptoms, or any other complaint at this time.    All other systems reviewed and are negative    HPI    Review of Systems      Objective       ED Triage Vitals   Temperature Pulse Blood Pressure Respirations SpO2 Patient Position - Orthostatic VS   02/01/25 1606 02/01/25 1604 02/01/25 1604 02/01/25 1604 02/01/25 1604 02/01/25 1604   98 °F (36.7 °C) 75 128/84 18 98 % Lying      Temp Source Heart Rate Source BP Location FiO2 (%) Pain Score    02/01/25 1606 02/01/25 1604 02/01/25 1604 -- --    Oral Monitor Right arm        Vitals      Date and Time Temp Pulse SpO2 Resp BP Pain Score FACES Pain Rating User   02/01/25 1606 98 °F (36.7 °C) -- -- -- -- -- -- AW    02/01/25 1604 -- 75 98 % 18 128/84 -- -- AW            Physical Exam    General appearance: resting comfortably, no acute distress   HENT: Normocephalic, atraumatic, hearing grossly intact, and mucous membranes moist   -Neck range of motion full, non tender  -No appreciable carotid bruit/thrill  Eyes: Conjunctiva normal,  Lungs/Chest: Respirations even and unlabored, breath sounds normal  -Mild point tenderness anterior chest wall  Cardiovascular: Normal rate, regular rhythm  Abdomen: soft, non-distended, milt epigastric tenderness without rebound  Musculoskeletal: extremities normal, atraumatic, no cyanosis or edema   Pulses: radial / dorsalis pedis pulses palpable  Skin: warm and dry   Neurologic: Awake and alert, no apparent focal deficit  Face symmetric, PERRL. EOM Intact  Speaks spontaneously, actively following commands.   Full strength, moving all extremities equally. Sensation intact to light touch B/L UE and LE.  Psych: Mood consistent with affect     Results Reviewed       Procedure Component Value Units Date/Time    HS Troponin 0hr (reflex protocol) [[id number]]  (Normal) Collected: 02/01/25 1635    Lab Status: Final result Specimen: Blood from Arm, Right Updated: 02/01/25 1720     hs TnI 0hr <2 ng/L     TSH, 3rd generation with Free T4 reflex [[id number]]  (Normal) Collected: 02/01/25 1635    Lab Status: Final result Specimen: Blood from Arm, Right Updated: 02/01/25 1720     TSH 3RD GENERATON 1.169 uIU/mL     Comprehensive metabolic panel [[id number]]  (Abnormal) Collected: 02/01/25 1635    Lab Status: Final result Specimen: Blood from Arm, Right Updated: 02/01/25 1707     Sodium 139 mmol/L      Potassium 3.9 mmol/L      Chloride 106 mmol/L      CO2 25 mmol/L      ANION GAP 8 mmol/L      BUN 12 mg/dL      Creatinine 0.71 mg/dL      Glucose 94 mg/dL      Calcium 9.3 mg/dL      AST 12 U/L      ALT 11 U/L      Alkaline Phosphatase 55 U/L      Total Protein 6.6 g/dL      Albumin 4.3 g/dL      Total Bilirubin  0.25 mg/dL      eGFR 106 ml/min/1.73sq m     Narrative:      [employer] Kid[employer] guidelines for Chronic Kidney Disease (CKD):     Stage 1 with normal or high GFR (GFR > 90 mL/min/1.73 square meters)    Stage 2 Mild CKD (GFR = 60-89 mL/min/1.73 square meters)    Stage 3A Moderate CKD (GFR = 45-59 mL/min/1.73 square meters)    Stage 3B Moderate CKD (GFR = 30-44 mL/min/1.73 square meters)    Stage 4 Severe CKD (GFR = 15-29 mL/min/1.73 square meters)    Stage 5 End Stage CKD (GFR <15 mL/min/1.73 square meters)  Note: GFR calculation is accurate only with a steady state creatinine    CBC and differential [[id number]] Collected: 02/01/25 1635    Lab Status: Final result Specimen: Blood from Arm, Right Updated: 02/01/25 1646     WBC 9.00 Thousand/uL      RBC 4.67 Million/uL      Hemoglobin 13.6 g/dL      Hematocrit 40.6 %      MCV 87 fL      MCH 29.1 pg      MCHC 33.5 g/dL      RDW 13.1 %      MPV 9.9 fL      Platelets 265 Thousands/uL      nRBC 0 /100 WBCs      Segmented % 63 %      Immature Grans % 0 %      Lymphocytes % 29 %      Monocytes % 6 %      Eosinophils Relative 2 %      Basophils Relative 0 %      Absolute Neutrophils 5.56 Thousands/µL      Absolute Immature Grans 0.03 Thousand/uL      Absolute Lymphocytes 2.63 Thousands/µL      Absolute Monocytes 0.53 Thousand/µL      Eosinophils Absolute 0.21 Thousand/µL      Basophils Absolute 0.04 Thousands/µL     Fingerstick Glucose (POCT) [[id number]]  (Normal) Collected: 02/01/25 1641    Lab Status: Final result Specimen: Blood Updated: 02/01/25 1641     POC Glucose 95 mg/dl             XR chest 2 views   ED Interpretation by Anup Segura MD (02/01 1736)   No acute cardiopulmonary process      Final Interpretation by Johanny Leong MD (02/02 0719)      No acute cardiopulmonary disease.            Workstation performed: [id number]             Procedures    ED Medication and Procedure Management   Prior to Admission Medications    Prescriptions Last Dose Informant Patient Reported? Taking?   ACIDOPHILUS LACTOBACILLUS PO  Self Yes No   Sig: Take one tablet by mouth daily for supplement    ALPRAZolam (XANAX) 0.5 mg tablet  Self Yes No   Sig: Take by mouth   Multiple Vitamin (MULTIVITAMIN) tablet  Self Yes No   Sig: Take 1 tablet by mouth daily   cyclobenzaprine (FLEXERIL) 10 mg tablet  Self Yes No   Sig: TAKE 1 TABLET EVERY 8 HOURS AS NEEDED MUSCLE SPASMS   estradiol (ESTRACE) 0.1 mg/g vaginal cream  Self No No   Sig: Apply thin film to affected area 2 times per day for one week, then nightly   Patient not taking: Reported on 2019   fluconazole (DIFLUCAN) 150 mg tablet  Self Yes No   Sig: One daily x 5 days   hydrOXYzine HCL (ATARAX) 25 mg tablet   No No   Sig: Take 1 tablet (25 mg total) by mouth every 6 (six) hours as needed for itching   metoclopramide (Reglan) 10 mg tablet   No No   Sig: Take 1 tablet (10 mg total) by mouth every 6 (six) hours as needed (vomiting)   nystatin-triamcinolone (MYCOLOG-II) cream   No No   Sig: Apply topically 2 (two) times a day for 14 days   ondansetron (ZOFRAN-ODT) 4 mg disintegrating tablet  Self Yes No   Sig: . TAKE 1-2 TABS BY MOUTH EVERY 8 HOURS AS NEEDED FOR NAUSEA/VOMITING   rizatriptan (MAXALT-MLT) 10 MG disintegrating tablet  Self Yes No   Sig: Take 10 mg by mouth   sertraline (ZOLOFT) 50 mg tablet  Self Yes No   Si/2TAB EACH EVENING FOR A WEEK THEN GOTO 1TAB THEREAFTER FOR MOOD   terconazole (TERAZOL 3) 0.8 % vaginal cream  Self Yes No   Sig: Apply daily as directed for yeast vaginitis      Facility-Administered Medications: None     Discharge Medication List as of 2025  6:15 PM        START taking these medications    Details   famotidine (PEPCID) 20 mg tablet Take 1 tablet (20 mg total) by mouth 2 (two) times a day, Starting Sat 2025, Normal           CONTINUE these medications which have NOT CHANGED    Details   ACIDOPHILUS LACTOBACILLUS PO Take one tablet by mouth daily for  supplement , Historical Med      ALPRAZolam (XANAX) 0.5 mg tablet Take by mouth, Starting Tue 8/22/2017, Historical Med      cyclobenzaprine (FLEXERIL) 10 mg tablet TAKE 1 TABLET EVERY 8 HOURS AS NEEDED MUSCLE SPASMS, Historical Med      estradiol (ESTRACE) 0.1 mg/g vaginal cream Apply thin film to affected area 2 times per day for one week, then nightly, Normal      fluconazole (DIFLUCAN) 150 mg tablet One daily x 5 days, Historical Med      hydrOXYzine HCL (ATARAX) 25 mg tablet Take 1 tablet (25 mg total) by mouth every 6 (six) hours as needed for itching, Starting Fri 5/24/2019, Normal      metoclopramide (Reglan) 10 mg tablet Take 1 tablet (10 mg total) by mouth every 6 (six) hours as needed (vomiting), Starting Wed 8/24/2022, Normal      Multiple Vitamin (MULTIVITAMIN) tablet Take 1 tablet by mouth daily, Historical Med      nystatin-triamcinolone (MYCOLOG-II) cream Apply topically 2 (two) times a day for 14 days, Starting Fri 5/24/2019, Until Fri 6/7/2019, Normal      ondansetron (ZOFRAN-ODT) 4 mg disintegrating tablet . TAKE 1-2 TABS BY MOUTH EVERY 8 HOURS AS NEEDED FOR NAUSEA/VOMITING, Historical Med      rizatriptan (MAXALT-MLT) 10 MG disintegrating tablet Take 10 mg by mouth, Starting Fri 2/2/2018, Until Tue 4/30/2019, Historical Med      sertraline (ZOLOFT) 50 mg tablet 1/2TAB EACH EVENING FOR A WEEK THEN GOTO 1TAB THEREAFTER FOR MOOD, Historical Med      terconazole (TERAZOL 3) 0.8 % vaginal cream Apply daily as directed for yeast vaginitis, Historical Med             ED SEPSIS DOCUMENTATION   Time reflects when diagnosis was documented in both MDM as applicable and the Disposition within this note       Time User Action Codes Description Comment    2/1/2025  5:43 PM Anup Segura [R07.9] Chest pain     2/1/2025  5:49 PM Anup Segura [R10.13] Acute epigastric pain                  Anup Segura MD  02/06/25 [id number]

## 2025-02-01 NOTE — ED ATTENDING ATTESTATION
2/1/2025  I, Femi Aviles MD, saw and evaluated the patient. I have discussed the patient with the resident/non-physician practitioner and agree with the resident's/non-physician practitioner's findings, Plan of Care, and MDM as documented in the resident's/non-physician practitioner's note, except where noted. All available labs and Radiology studies were reviewed.  I was present for key portions of any procedure(s) performed by the resident/non-physician practitioner and I was immediately available to provide assistance.       At this point I agree with the current assessment done in the Emergency Department.  I have conducted an independent evaluation of this patient a history and physical is as follows:  Briefly, 39-year-old female presenting with chest pain that began about 1330 today.  This pain is a feeling of tightness in the center and left side of the chest, rating towards the jaw on the arm, unchanged with movement position exertion or other factors.  Patient does state that while doing the dishes during this pain she did feel slightly lightheaded but her pain did not change.  She denies shortness of breath, fever, numbness, weakness, sweating, nausea, vomiting, trauma, other symptoms.  Patient denies prior episodes.  On examination, heart sounds normal, lungs clear to auscultation, no bruits heard over the carotids, 2+ pulses x 4, symmetric in both arms and both legs, no swelling of the lower extremities, abdomen nontender and overall benign.  EKG not acutely ischemic, showing normal sinus rhythm with a rate of 76 and no evidence of ischemia or significant arrhythmia.  Chest x-ray clear, labs overall reassuring with negative troponin less than 2 after greater than 3 hours of pain.  The pain did resolve with antacids.  Felt to be very low risk for ACS at this time, do not suspect bacterial pneumonia, pneumothorax, perforated ulcer, thoracic aortic dissection, PE, other acute life threat.   Discharged with strict return precautions, follow-up primary care doctor as well as gastroenterology and cardiology for further risk stratification  ED Course         Critical Care Time  Procedures

## 2025-02-02 LAB
ATRIAL RATE: 76 BPM
P AXIS: 54 DEGREES
PR INTERVAL: 160 MS
QRS AXIS: 36 DEGREES
QRSD INTERVAL: 88 MS
QT INTERVAL: 408 MS
QTC INTERVAL: 459 MS
T WAVE AXIS: 63 DEGREES
VENTRICULAR RATE: 76 BPM

## 2025-02-02 PROCEDURE — 93010 ELECTROCARDIOGRAM REPORT: CPT | Performed by: INTERNAL MEDICINE

## 2025-02-14 ENCOUNTER — HOSPITAL ENCOUNTER (EMERGENCY)
Facility: HOSPITAL | Age: 40
Discharge: HOME | End: 2025-02-14
Attending: EMERGENCY MEDICINE
Payer: COMMERCIAL

## 2025-02-14 VITALS
HEART RATE: 87 BPM | OXYGEN SATURATION: 95 % | RESPIRATION RATE: 16 BRPM | SYSTOLIC BLOOD PRESSURE: 130 MMHG | TEMPERATURE: 97.9 F | DIASTOLIC BLOOD PRESSURE: 78 MMHG

## 2025-02-14 DIAGNOSIS — G43.909 MIGRAINE: Primary | ICD-10-CM

## 2025-02-14 PROCEDURE — 96375 TX/PRO/DX INJ NEW DRUG ADDON: CPT

## 2025-02-14 PROCEDURE — 99282 EMERGENCY DEPT VISIT SF MDM: CPT

## 2025-02-14 PROCEDURE — 96365 THER/PROPH/DIAG IV INF INIT: CPT

## 2025-02-14 PROCEDURE — 99284 EMERGENCY DEPT VISIT MOD MDM: CPT | Performed by: EMERGENCY MEDICINE

## 2025-02-14 RX ORDER — DIPHENHYDRAMINE HYDROCHLORIDE 50 MG/ML
25 INJECTION INTRAMUSCULAR; INTRAVENOUS ONCE
Status: COMPLETED | OUTPATIENT
Start: 2025-02-14 | End: 2025-02-14

## 2025-02-14 RX ORDER — METOCLOPRAMIDE 10 MG/1
10 TABLET ORAL EVERY 6 HOURS PRN
Qty: 30 TABLET | Refills: 0 | Status: SHIPPED | OUTPATIENT
Start: 2025-02-14

## 2025-02-14 RX ORDER — DEXAMETHASONE SODIUM PHOSPHATE 10 MG/ML
10 INJECTION, SOLUTION INTRAMUSCULAR; INTRAVENOUS ONCE
Status: COMPLETED | OUTPATIENT
Start: 2025-02-14 | End: 2025-02-14

## 2025-02-14 RX ORDER — MAGNESIUM SULFATE HEPTAHYDRATE 40 MG/ML
2 INJECTION, SOLUTION INTRAVENOUS ONCE
Status: COMPLETED | OUTPATIENT
Start: 2025-02-14 | End: 2025-02-14

## 2025-02-14 RX ORDER — KETOROLAC TROMETHAMINE 30 MG/ML
30 INJECTION, SOLUTION INTRAMUSCULAR; INTRAVENOUS ONCE
Status: COMPLETED | OUTPATIENT
Start: 2025-02-14 | End: 2025-02-14

## 2025-02-14 RX ORDER — SUMATRIPTAN 6 MG/.5ML
6 INJECTION, SOLUTION SUBCUTANEOUS ONCE
Status: DISCONTINUED | OUTPATIENT
Start: 2025-02-14 | End: 2025-02-14 | Stop reason: HOSPADM

## 2025-02-14 RX ORDER — METOCLOPRAMIDE HYDROCHLORIDE 5 MG/ML
10 INJECTION INTRAMUSCULAR; INTRAVENOUS ONCE
Status: COMPLETED | OUTPATIENT
Start: 2025-02-14 | End: 2025-02-14

## 2025-02-14 RX ADMIN — KETOROLAC TROMETHAMINE 30 MG: 30 INJECTION, SOLUTION INTRAMUSCULAR; INTRAVENOUS at 02:45

## 2025-02-14 RX ADMIN — SODIUM CHLORIDE 1000 ML: 0.9 INJECTION, SOLUTION INTRAVENOUS at 02:45

## 2025-02-14 RX ADMIN — METOCLOPRAMIDE 10 MG: 5 INJECTION, SOLUTION INTRAMUSCULAR; INTRAVENOUS at 02:46

## 2025-02-14 RX ADMIN — DIPHENHYDRAMINE HYDROCHLORIDE 25 MG: 50 INJECTION, SOLUTION INTRAMUSCULAR; INTRAVENOUS at 02:47

## 2025-02-14 RX ADMIN — DEXAMETHASONE SODIUM PHOSPHATE 10 MG: 10 INJECTION INTRAMUSCULAR; INTRAVENOUS at 02:48

## 2025-02-14 RX ADMIN — MAGNESIUM SULFATE HEPTAHYDRATE 2 G: 40 INJECTION, SOLUTION INTRAVENOUS at 02:45

## 2025-02-14 NOTE — ED ATTENDING ATTESTATION
2/14/2025  I, Mumtaz Hernandez MD, saw and evaluated the patient. I have discussed the patient with the resident/non-physician practitioner and agree with the resident's/non-physician practitioner's findings, Plan of Care, and MDM as documented in the resident's/non-physician practitioner's note, except where noted. All available labs and Radiology studies were reviewed.  I was present for key portions of any procedure(s) performed by the resident/non-physician practitioner and I was immediately available to provide assistance.       At this point I agree with the current assessment done in the Emergency Department.  I have conducted an independent evaluation of this patient a history and physical is as follows: Patient is a 39 year old female with a few days of worsening bilateral migraine headache. (+) N/V. (+) photophobia. (+) dizziness. Has h/o migraines and this feels similar. Tried migraine meds at home without relief. No trauma. No fever. No SAH in family. LMP - 1 week ago. Has had prior TL. Was last seen in this ED on 2/1/25 for chest pain and epigastric pain. States she had the flu last week. [facility] website checked on this patient and last Rx filled was on 9/23/24 for ativan for 5 day supply. NCAT. (+) photosensitivity. EENT exam as per ED resident. Lungs clear. Heart regular without murmur. Abdomen soft and nontender. Good bowel sounds. No edema. No rash noted. No gross focal deficits. DDx including but not limited to: Migraine headache, atypical migraine headache, tension headache, cluster headache; doubt ICH, SAH, tumor, meningitis, temporal arteritis, carbon monoxide poisoning, zoster, sinusitis. Will give IV migraine cocktail.     ED Course         Critical Care Time  Procedures

## 2025-02-14 NOTE — ED PROVIDER NOTES
Time reflects when diagnosis was documented in both MDM as applicable and the Disposition within this note       Time User Action Codes Description Comment    2/14/2025  4:00 AM Evelyn Gurpreet Add [G43.909] Migraine           ED Disposition       ED Disposition   Discharge    Condition   Stable    Date/Time   Fri Feb 14, 2025  3:59 AM    Comment   Shyann Yates discharge to home/self care.                   Assessment & Plan       Medical Decision Making  This patient presents with a headache most consistent with benign headache from either tension type headache vs migraine. No headache red flags. Neurologic exam without evidence of meningismus, AMS, focal neurologic findings so doubt meningitis, encephalitis, stroke. Presentation not consistent with acute intracranial bleed to include SAH (lack of risk factors, headache history). No history of trauma so doubt ICH. Doubt carotid artery dissection given no focal neuro deficits, no neck trauma or recent neck strain. Patient with no signs of increased intracranial pressure or weight loss and history and physical suggest more benign headache so less likely mass effect in brain from tumor or abscess or idiopathic intracranial hypertension. Pain was controlled with IV Toradol, Benadryl, Reglan, Decadron headache cocktail.  Headache improved but not fully resolved.  Patient's pain though adequately controlled to her preference.  Patient given prescription for oral Reglan for additional migraine management at home to be used with her prescribed migraine abortive medication.  Ambulatory referral for outpatient neurology given.  Management and discharge plan discussed with patient and partner at bedside, both of whom are in agreement.  Patient discharged home.    Risk  Prescription drug management.             Medications   SUMAtriptan (IMITREX) subcutaneous injection 6 mg (6 mg Subcutaneous Not Given 2/14/25 0302)   ketorolac (TORADOL) injection 30 mg (30 mg  Intravenous Given 2/14/25 0245)   metoclopramide (REGLAN) injection 10 mg (10 mg Intravenous Given 2/14/25 0246)   diphenhydrAMINE (BENADRYL) injection 25 mg (25 mg Intravenous Given 2/14/25 0247)   magnesium sulfate 2 g/50 mL IVPB (premix) 2 g (0 g Intravenous Stopped 2/14/25 0402)   sodium chloride 0.9 % bolus 1,000 mL (0 mL Intravenous Stopped 2/14/25 0338)   dexamethasone (PF) (DECADRON) injection 10 mg (10 mg Intravenous Given 2/14/25 0248)       ED Risk Strat Scores                          SBIRT 20yo+      Flowsheet Row Most Recent Value   Initial Alcohol Screen: US AUDIT-C     1. How often do you have a drink containing alcohol? 0 Filed at: 02/14/2025 0209   2. How many drinks containing alcohol do you have on a typical day you are drinking?  0 Filed at: 02/14/2025 0209   3a. Male UNDER 65: How often do you have five or more drinks on one occasion? 0 Filed at: 02/14/2025 0209   3b. FEMALE Any Age, or MALE 65+: How often do you have 4 or more drinks on one occassion? 0 Filed at: 02/14/2025 0209   Audit-C Score 0 Filed at: 02/14/2025 0209   SOLO: How many times in the past year have you...    Used an illegal drug or used a prescription medication for non-medical reasons? Never Filed at: 02/14/2025 0209                            History of Present Illness       Chief Complaint   Patient presents with    Migraine     Pt with hx of migraines c/o migraine for several days. Pt took meds at home for migraines with no relief.        Past Medical History:   Diagnosis Date    Anxiety     Candidal vulvitis 7/19/2017    Depression     GERD (gastroesophageal reflux disease)     History of ITP     as teen    Migraine     Normal delivery     2016 daughter    Urinary retention 5/17/2016    Varicella       Past Surgical History:   Procedure Laterality Date    SD LAPAROSCOPY W/RMVL ADNEXAL STRUCTURES Bilateral 8/21/2017    bilateral salpingectomy    TONSILLECTOMY      TUBAL LIGATION      fallopian removal    WISDOM TOOTH  EXTRACTION        Family History   Problem Relation Age of Onset    Heart defect Daughter         ALCAPA    ALS Maternal Grandmother     Heart block Maternal Grandmother       Social History     Tobacco Use    Smoking status: Never    Smokeless tobacco: Never   Vaping Use    Vaping status: Never Used   Substance Use Topics    Alcohol use: Yes     Comment: wine rare    Drug use: Yes     Types: Marijuana      E-Cigarette/Vaping    E-Cigarette Use Never User       E-Cigarette/Vaping Substances    Nicotine No     THC No     CBD No     Flavoring No     Other No     Unknown No       I have reviewed and agree with the history as documented.     Pt is a 39 y.o. female who presents to the ED on February 14, 2025. Patient presents with migraine headache with associated photophobia, and nausea for the last 2 days.  Patient attempted prescribed abortive medication with no response or improvement in headache.  Patient denied thunderclap headache, stating that headache initially began at 2 out of 10 and progressed to now 10 out of 10.  Patient describes pain as periorbital bilaterally, radiating to the sinuses, and back of the head.  Patient's last dosage of rizatriptan (prescribed migraine abortive medication) was last afternoon prior to arrival.  Patient otherwise denies any changes in vision, focal neurologic deficits, numbness, weakness, seizure-like activity.  Patient otherwise denies any chest pain, shortness of breath, vomiting, diarrhea, constipation, suprapubic pain, flank pain, hematuria, dysuria.  Review of systems otherwise negative, no other concerns at this time.      Migraine      Review of Systems        Objective       ED Triage Vitals   Temperature Pulse Blood Pressure Respirations SpO2 Patient Position - Orthostatic VS   02/14/25 0202 02/14/25 0202 02/14/25 0202 02/14/25 0202 02/14/25 0202 --   97.9 °F (36.6 °C) 87 130/78 16 95 %       Temp src Heart Rate Source BP Location FiO2 (%) Pain Score    -- -- -- --  02/14/25 0230        10 - Worst Possible Pain      Vitals      Date and Time Temp Pulse SpO2 Resp BP Pain Score FACES Pain Rating User   02/14/25 0402 -- -- -- -- -- 4 --    02/14/25 0245 -- -- -- -- -- 10 - Worst Possible Pain --    02/14/25 0230 -- -- -- -- -- 10 - Worst Possible Pain --    02/14/25 0202 97.9 °F (36.6 °C) 87 95 % 16 130/78 -- -- JLZ            Physical Exam  Vitals and nursing note reviewed.   Constitutional:       General: She is not in acute distress.     Appearance: She is well-developed.   HENT:      Head: Normocephalic and atraumatic.   Eyes:      Conjunctiva/sclera: Conjunctivae normal.   Cardiovascular:      Rate and Rhythm: Normal rate and regular rhythm.      Heart sounds: No murmur heard.  Pulmonary:      Effort: Pulmonary effort is normal. No respiratory distress.      Breath sounds: Normal breath sounds.   Abdominal:      Palpations: Abdomen is soft.      Tenderness: There is no abdominal tenderness.   Musculoskeletal:         General: No swelling.      Cervical back: Neck supple.   Skin:     General: Skin is warm and dry.      Capillary Refill: Capillary refill takes less than 2 seconds.   Neurological:      Mental Status: She is alert.   Psychiatric:         Mood and Affect: Mood normal.         Results Reviewed       None            No orders to display       Procedures    ED Medication and Procedure Management   Prior to Admission Medications   Prescriptions Last Dose Informant Patient Reported? Taking?   ACIDOPHILUS LACTOBACILLUS PO  Self Yes No   Sig: Take one tablet by mouth daily for supplement    ALPRAZolam (XANAX) 0.5 mg tablet  Self Yes No   Sig: Take by mouth   Multiple Vitamin (MULTIVITAMIN) tablet  Self Yes No   Sig: Take 1 tablet by mouth daily   cyclobenzaprine (FLEXERIL) 10 mg tablet  Self Yes No   Sig: TAKE 1 TABLET EVERY 8 HOURS AS NEEDED MUSCLE SPASMS   estradiol (ESTRACE) 0.1 mg/g vaginal cream  Self No No   Sig: Apply thin film to affected area 2 times per  day for one week, then nightly   Patient not taking: Reported on 2019   famotidine (PEPCID) 20 mg tablet   No No   Sig: Take 1 tablet (20 mg total) by mouth 2 (two) times a day   fluconazole (DIFLUCAN) 150 mg tablet  Self Yes No   Sig: One daily x 5 days   hydrOXYzine HCL (ATARAX) 25 mg tablet   No No   Sig: Take 1 tablet (25 mg total) by mouth every 6 (six) hours as needed for itching   metoclopramide (Reglan) 10 mg tablet   No No   Sig: Take 1 tablet (10 mg total) by mouth every 6 (six) hours as needed (vomiting)   nystatin-triamcinolone (MYCOLOG-II) cream   No No   Sig: Apply topically 2 (two) times a day for 14 days   ondansetron (ZOFRAN-ODT) 4 mg disintegrating tablet  Self Yes No   Sig: . TAKE 1-2 TABS BY MOUTH EVERY 8 HOURS AS NEEDED FOR NAUSEA/VOMITING   rizatriptan (MAXALT-MLT) 10 MG disintegrating tablet  Self Yes No   Sig: Take 10 mg by mouth   sertraline (ZOLOFT) 50 mg tablet  Self Yes No   Si/2TAB EACH EVENING FOR A WEEK THEN GOTO 1TAB THEREAFTER FOR MOOD   terconazole (TERAZOL 3) 0.8 % vaginal cream  Self Yes No   Sig: Apply daily as directed for yeast vaginitis      Facility-Administered Medications: None     Discharge Medication List as of 2025  4:06 AM        START taking these medications    Details   !! metoclopramide (Reglan) 10 mg tablet Take 1 tablet (10 mg total) by mouth every 6 (six) hours as needed (for migraine ), Starting 2025, Normal       !! - Potential duplicate medications found. Please discuss with provider.        CONTINUE these medications which have NOT CHANGED    Details   ACIDOPHILUS LACTOBACILLUS PO Take one tablet by mouth daily for supplement , Historical Med      ALPRAZolam (XANAX) 0.5 mg tablet Take by mouth, Starting 2017, Historical Med      cyclobenzaprine (FLEXERIL) 10 mg tablet TAKE 1 TABLET EVERY 8 HOURS AS NEEDED MUSCLE SPASMS, Historical Med      estradiol (ESTRACE) 0.1 mg/g vaginal cream Apply thin film to affected area 2  times per day for one week, then nightly, Normal      famotidine (PEPCID) 20 mg tablet Take 1 tablet (20 mg total) by mouth 2 (two) times a day, Starting Sat 2/1/2025, Normal      fluconazole (DIFLUCAN) 150 mg tablet One daily x 5 days, Historical Med      hydrOXYzine HCL (ATARAX) 25 mg tablet Take 1 tablet (25 mg total) by mouth every 6 (six) hours as needed for itching, Starting Fri 5/24/2019, Normal      !! metoclopramide (Reglan) 10 mg tablet Take 1 tablet (10 mg total) by mouth every 6 (six) hours as needed (vomiting), Starting Wed 8/24/2022, Normal      Multiple Vitamin (MULTIVITAMIN) tablet Take 1 tablet by mouth daily, Historical Med      nystatin-triamcinolone (MYCOLOG-II) cream Apply topically 2 (two) times a day for 14 days, Starting Fri 5/24/2019, Until Fri 6/7/2019, Normal      ondansetron (ZOFRAN-ODT) 4 mg disintegrating tablet . TAKE 1-2 TABS BY MOUTH EVERY 8 HOURS AS NEEDED FOR NAUSEA/VOMITING, Historical Med      rizatriptan (MAXALT-MLT) 10 MG disintegrating tablet Take 10 mg by mouth, Starting Fri 2/2/2018, Until Tue 4/30/2019, Historical Med      sertraline (ZOLOFT) 50 mg tablet 1/2TAB EACH EVENING FOR A WEEK THEN GOTO 1TAB THEREAFTER FOR MOOD, Historical Med      terconazole (TERAZOL 3) 0.8 % vaginal cream Apply daily as directed for yeast vaginitis, Historical Med       !! - Potential duplicate medications found. Please discuss with provider.          ED SEPSIS DOCUMENTATION   Time reflects when diagnosis was documented in both MDM as applicable and the Disposition within this note       Time User Action Codes Description Comment    2/14/2025  4:00 AM Gurpreet Rivas Add [G43.909] Migraine                  Gurpreet Rivas MD  02/14/25 0448

## 2025-02-14 NOTE — DISCHARGE INSTRUCTIONS
You were given a prescription for metoclopramide 10 mg (Reglan) please use as needed if you have a migraine attack.  Please take a single 10 mg tablet of Reglan with a single tablet of Benadryl in addition to your normal migraine abortive medication. Reglan may be used every 6 hours as needed.

## (undated) DEVICE — INTENDED FOR TISSUE SEPARATION, AND OTHER PROCEDURES THAT REQUIRE A SHARP SURGICAL BLADE TO PUNCTURE OR CUT.: Brand: BARD-PARKER SAFETY BLADES SIZE 11, STERILE

## (undated) DEVICE — GLOVE INDICATOR PI UNDERGLOVE SZ 8 BLUE

## (undated) DEVICE — ENDOPATH XCEL UNIVERSAL TROCAR STABLILITY SLEEVES: Brand: ENDOPATH XCEL

## (undated) DEVICE — MAYO STAND COVER: Brand: CONVERTORS

## (undated) DEVICE — SUT MONOCRYL 4-0 PS-2 18 IN Y496G

## (undated) DEVICE — UNIVERSAL GYN LAPAROSCOPY,KIT: Brand: CARDINAL HEALTH

## (undated) DEVICE — ADHESIVE SKN CLSR HISTOACRYL FLEX 0.5ML LF

## (undated) DEVICE — CHLORAPREP HI-LITE 26ML ORANGE

## (undated) DEVICE — GLOVE SRG BIOGEL ECLIPSE 7.5

## (undated) DEVICE — ENDOPATH XCEL BLADELESS TROCARS WITH STABILITY SLEEVES: Brand: ENDOPATH XCEL